# Patient Record
Sex: MALE | Race: WHITE | Employment: OTHER | RURAL
[De-identification: names, ages, dates, MRNs, and addresses within clinical notes are randomized per-mention and may not be internally consistent; named-entity substitution may affect disease eponyms.]

---

## 2017-03-24 ENCOUNTER — TELEPHONE (OUTPATIENT)
Dept: FAMILY MEDICINE CLINIC | Age: 57
End: 2017-03-24

## 2017-03-24 NOTE — TELEPHONE ENCOUNTER
Patient would like to be seen for complaints of stomach problems with \"a lump\" and \"fire\" feeling. He notes he woke a few days ago at 0430 hrs with vomiting. HE IS IN [facility] and says he'll be glad to go to that office if he can get worked in.

## 2021-10-29 ENCOUNTER — HOSPITAL ENCOUNTER (EMERGENCY)
Age: 61
Discharge: HOME | End: 2021-10-29
Attending: EMERGENCY MEDICINE
Payer: COMMERCIAL

## 2021-10-29 VITALS
RESPIRATION RATE: 14 BRPM | SYSTOLIC BLOOD PRESSURE: 101 MMHG | WEIGHT: 160 LBS | TEMPERATURE: 98.8 F | OXYGEN SATURATION: 100 % | BODY MASS INDEX: 21.67 KG/M2 | HEART RATE: 92 BPM | HEIGHT: 72 IN | DIASTOLIC BLOOD PRESSURE: 55 MMHG

## 2021-10-29 DIAGNOSIS — L08.9 INFECTED SEBACEOUS CYST: Primary | ICD-10-CM

## 2021-10-29 DIAGNOSIS — L72.3 INFECTED SEBACEOUS CYST: Primary | ICD-10-CM

## 2021-10-29 PROCEDURE — 75810000289 HC I&D ABSCESS SIMP/COMP/MULT

## 2021-10-29 PROCEDURE — 99282 EMERGENCY DEPT VISIT SF MDM: CPT

## 2021-10-29 RX ORDER — DOXYCYCLINE HYCLATE 100 MG
100 TABLET ORAL 2 TIMES DAILY
Qty: 20 TABLET | Refills: 0 | Status: SHIPPED | OUTPATIENT
Start: 2021-10-29 | End: 2021-11-08

## 2021-10-29 NOTE — Clinical Note
[address] [place] [address] EMERGENCY DEP  [address] [place] [address] Dr Ed Veloz [id number]  [phone]    Work/School Note    Date: 10/29/2021    To Whom It May concern:    Gifty Medley was seen and treated today in the emergency room by the following provider(s):  Attending Provider: Elsa Boucher MD.      Gifty Medley is excused from work/school on 10/29/21 and 10/30/21. He is medically clear to return to work/school on 10/31/2021.        Sincerely,          Socorro Davis MD

## 2021-10-29 NOTE — ED PROVIDER NOTES
EMERGENCY DEPARTMENT HISTORY AND PHYSICAL EXAM      Date: 10/29/2021  Patient Name: Norma Longoria    History of Presenting Illness     Chief Complaint   Patient presents with    Skin Problem       History Provided By: Patient    HPI: Norma Longoria, 57 y.o. male with PMHx significant for Parkinson's disease, presents ambulatory to the ED with cc of cyst. Patient reports he has a cyst on his right mid back for the past 3 days. He has had similar episode before and was seen at urgent care and had it drained. No fevers or chills. He reports some mild aching pain in the area. He was unable to squeeze it due to its location. No drainage. No open wounds. He is otherwise without complaints. Had noticed some redness in the area this morning. PMHx: Significant for Parkinson's disease  PSHx: Significant for hernia repair. Social Hx: Non-smoker. There are no other complaints, changes, or physical findings at this time. PCP: None    No current facility-administered medications on file prior to encounter. Current Outpatient Medications on File Prior to Encounter   Medication Sig Dispense Refill    naproxen sodium (ANAPROX DS) 550 mg tablet Take 1 Tab by mouth two (2) times daily (with meals). Prn heel pain 30 Tab 2    amantadine HCl (SYMMETREL) 100 mg tablet Take 100 mg by mouth two (2) times a day.  pramipexole (MIRAPEX) 1 mg tablet Take 1 mg by mouth three (3) times daily.  carbidopa-levodopa (SINEMET)  mg per tablet Take 2 tablets by mouth four (4) times daily.  FERROUS FUMARATE/VIT BCOMP&C (SUPER B COMPLEX PO) Take 1 tablet by mouth daily.  cholecalciferol (VITAMIN D3) 1,000 unit tablet Take 1,000 Units by mouth daily.          Past History     Past Medical History:  Past Medical History:   Diagnosis Date    Parkinson disease [facility])        Past Surgical History:  Past Surgical History:   Procedure Laterality Date    HX COLONOSCOPY  2013    HX HERNIA REPAIR         Family History:  History reviewed. No pertinent family history. Social History:  Social History     Tobacco Use    Smoking status: Never Smoker    Smokeless tobacco: Never Used   Substance Use Topics    Alcohol use: No     Alcohol/week: 0.0 standard drinks    Drug use: Not on file       Allergies:  No Known Allergies      Review of Systems   Review of Systems   Constitutional: Negative for activity change, chills and fever. HENT: Negative for congestion and sore throat. Eyes: Negative for pain and redness. Respiratory: Negative for cough, chest tightness and shortness of breath. Cardiovascular: Negative for chest pain and palpitations. Gastrointestinal: Negative for abdominal pain, diarrhea, nausea and vomiting. Genitourinary: Negative for dysuria, frequency and urgency. Musculoskeletal: Negative for back pain and neck pain. Skin: Positive for rash. Neurological: Negative for syncope, light-headedness and headaches. Psychiatric/Behavioral: Negative for confusion. All other systems reviewed and are negative. Physical Exam   Physical Exam  Constitutional:       General: He is not in acute distress. Appearance: Normal appearance. He is well-developed and normal weight. He is not ill-appearing or diaphoretic. HENT:      Head: Normocephalic and atraumatic. Eyes:      General: No scleral icterus. Conjunctiva/sclera: Conjunctivae normal.      Pupils: Pupils are equal, round, and reactive to light. Cardiovascular:      Rate and Rhythm: Normal rate and regular rhythm. Pulmonary:      Effort: Pulmonary effort is normal.   Musculoskeletal:         General: Normal range of motion. Skin:     General: Skin is warm and dry. Findings: No rash. Comments: 3 cm diameter cystlike nodule to right mid back, proximally 5 cm lateral to spinous process. With surrounding erythema. Nonfluctuant. No open wounds. Nonpointing. No induration. Mild tenderness to palpation. Skin intact.  No drainage. Neurological:      Mental Status: He is alert and oriented to person, place, and time. Psychiatric:         Behavior: Behavior normal.             Diagnostic Study Results     Labs -   No results found for this or any previous visit (from the past 12 hour(s)). Radiologic Studies -   No orders to display     CT Results  (Last 48 hours)    None        CXR Results  (Last 48 hours)    None            Medical Decision Making   I am the first provider for this patient. I reviewed the vital signs, available nursing notes, past medical history, past surgical history, family history and social history. Vital Signs-Reviewed the patient's vital signs. Patient Vitals for the past 12 hrs:   Temp Pulse Resp BP SpO2   10/29/21 0853    (!) 101/55    10/29/21 0852 98.8 °F (37.1 °C) 92 14 129/66 100 %           Records Reviewed: Nursing notes reviewed    Provider Notes (Medical Decision Making):   DDX: Abscess, infected sebaceous cyst.    ED Course:   Initial assessment performed. The patients presenting problems have been discussed, and they are in agreement with the care plan formulated and outlined with them. I have encouraged them to ask questions as they arise throughout their visit. Procedure Note - Incision and Drainage:   9:09 AM  Performed by: Elizabeth Francisco MD  Complexity: Simple  Skin prepped with Betadine. Sterile field established. Anesthesia achieved with 4 mLs of Lidocaine 1% with epinephrine using a local infiltration of 4 mL lidocaine 1% with epinephrine. Abscess to trunk was incised with # 11 blade, and 3mLs of cystic drainage was expressed. Wound probed and irrigated. Sterile dressing applied. Estimated blood loss: 1cc  The procedure took 1-15 minutes, and pt tolerated well. PROGRESS NOTE    Pt reevaluated. Recurrent infected cyst on right mid back. Cystic drainage on I&D. Will discharge with doxycycline x10 days.  Gave general surgery follow-up for definitive treatment with possible excision of cyst wall. Written by Ashkan Vivas MD     Progress note:    Pt noted to be feeling better and ready for discharge. Updated pt and/or family on all final lab and/or  imaging findings. Will follow up as instructed. All questions have been answered, pt voiced understanding and agreement with plan. Abx were prescribed, pt advised that diarrhea and rash are possible side effects of the medications. Specific return precautions provided as well as instructions to return to the ED should sx worsen at any time. Vital signs stable for discharge. I have also put together some discharge instructions for them that include: 1) educational information regarding their diagnosis, 2) how to care for their diagnosis at home, as well a 3) list of reasons why they would want to return to the ED prior to their follow-up appointment, should their condition change. Written by Ashkan Vivas MD        Critical Care Time:   0    Disposition:  Discharge    PLAN:  1. Current Discharge Medication List      START taking these medications    Details   doxycycline (VIBRA-TABS) 100 mg tablet Take 1 Tablet by mouth two (2) times a day for 10 days. Qty: 20 Tablet, Refills: 0  Start date: 10/29/2021, End date: 11/8/2021           2. Follow-up Information     Follow up With Specialties Details Why Contact Erica Avilez MD General Surgery Schedule an appointment as soon as possible for a visit in 2 weeks As needed [place] [id number]      [address] [facility] [address] [facility] Emergency Medicine Go in 2 days For wound re-check [address] [place] [id number]  [phone]        Return to ED if worse     Diagnosis     Clinical Impression:   1. Infected sebaceous cyst              Please note that this dictation was completed with [employer], the [employer] voice recognition software.   Quite often unanticipated grammatical, syntax, homophones, and other interpretive errors are inadvertently transcribed by the computer software. Please disregard these errors. Please excuse any errors that have escaped final proofreading.

## 2021-10-29 NOTE — ED TRIAGE NOTES
Pt arrived by POV with complaint of a cyst on his back for 3 days. Pt is wake alert and oriented x 4.   Pt educated on ER flow

## 2021-11-03 ENCOUNTER — HOSPITAL ENCOUNTER (EMERGENCY)
Age: 61
Discharge: HOME | End: 2021-11-03
Attending: EMERGENCY MEDICINE
Payer: COMMERCIAL

## 2021-11-03 VITALS
HEIGHT: 73 IN | OXYGEN SATURATION: 100 % | BODY MASS INDEX: 21.2 KG/M2 | DIASTOLIC BLOOD PRESSURE: 48 MMHG | RESPIRATION RATE: 18 BRPM | WEIGHT: 160 LBS | TEMPERATURE: 97.9 F | HEART RATE: 69 BPM | SYSTOLIC BLOOD PRESSURE: 105 MMHG

## 2021-11-03 DIAGNOSIS — L02.91 ABSCESS: Primary | ICD-10-CM

## 2021-11-03 PROCEDURE — 74011000250 HC RX REV CODE- 250: Performed by: EMERGENCY MEDICINE

## 2021-11-03 PROCEDURE — 75810000289 HC I&D ABSCESS SIMP/COMP/MULT

## 2021-11-03 PROCEDURE — 99282 EMERGENCY DEPT VISIT SF MDM: CPT

## 2021-11-03 RX ORDER — LIDOCAINE HYDROCHLORIDE 10 MG/ML
5 INJECTION INFILTRATION; PERINEURAL ONCE
Status: COMPLETED | OUTPATIENT
Start: 2021-11-03 | End: 2021-11-03

## 2021-11-03 RX ORDER — CEPHALEXIN 500 MG/1
500 CAPSULE ORAL 3 TIMES DAILY
Qty: 21 CAPSULE | Refills: 0 | Status: SHIPPED | OUTPATIENT
Start: 2021-11-03 | End: 2021-11-10

## 2021-11-03 RX ADMIN — LIDOCAINE HYDROCHLORIDE 5 ML: 10 INJECTION, SOLUTION INFILTRATION; PERINEURAL at 11:13

## 2021-11-03 NOTE — ED PROVIDER NOTES
EMERGENCY DEPARTMENT HISTORY AND PHYSICAL EXAM          Date: 11/3/2021  Patient Name: José Miguel Sheets    History of Presenting Illness     Chief Complaint   Patient presents with    Cyst       History Provided By: Patient    HPI: José Miguel Sheets is a 57 y.o. male, pmhx Parkinson's, who presents ambulatory to the ED c/o skin wound    Patient explains he was here in the ER and had incision of an infected cyst.  He states he was not sure what he should do and when they took off the bandages morning they noticed there was continued drainage so he came to the ER today. He is taking the antibiotics as prescribed. He has not had any fevers or chills and has minimal pain. PCP: None    Allergies: None    There are no other complaints, changes, or physical findings at this time. Current Outpatient Medications   Medication Sig Dispense Refill    cephALEXin (Keflex) 500 mg capsule Take 1 Capsule by mouth three (3) times daily for 7 days. 21 Capsule 0    doxycycline (VIBRA-TABS) 100 mg tablet Take 1 Tablet by mouth two (2) times a day for 10 days. 20 Tablet 0    naproxen sodium (ANAPROX DS) 550 mg tablet Take 1 Tab by mouth two (2) times daily (with meals). Prn heel pain 30 Tab 2    amantadine HCl (SYMMETREL) 100 mg tablet Take 100 mg by mouth two (2) times a day.  pramipexole (MIRAPEX) 1 mg tablet Take 1 mg by mouth three (3) times daily.  carbidopa-levodopa (SINEMET)  mg per tablet Take 2 tablets by mouth four (4) times daily.  FERROUS FUMARATE/VIT BCOMP&C (SUPER B COMPLEX PO) Take 1 tablet by mouth daily.  cholecalciferol (VITAMIN D3) 1,000 unit tablet Take 1,000 Units by mouth daily. Past History     Past Medical History:  Past Medical History:   Diagnosis Date    Parkinson disease [facility])        Past Surgical History:  Past Surgical History:   Procedure Laterality Date    HX COLONOSCOPY  2013    HX HERNIA REPAIR         Family History:  History reviewed.  No pertinent family history. Social History:  Social History     Tobacco Use    Smoking status: Never Smoker    Smokeless tobacco: Never Used   Substance Use Topics    Alcohol use: No     Alcohol/week: 0.0 standard drinks    Drug use: Not on file       Allergies:  No Known Allergies      Review of Systems   Review of Systems   Constitutional: Negative for activity change, appetite change, chills, fever and unexpected weight change. HENT: Negative for congestion. Eyes: Negative for pain and visual disturbance. Respiratory: Negative for cough and shortness of breath. Cardiovascular: Negative for chest pain. Gastrointestinal: Negative for abdominal pain, diarrhea, nausea and vomiting. Genitourinary: Negative for dysuria. Musculoskeletal: Negative for back pain. Skin: Positive for wound. Negative for rash. Neurological: Negative for headaches. Physical Exam   Physical Exam  Vitals and nursing note reviewed. Constitutional:       Appearance: He is well-developed. He is not diaphoretic. Comments: Is a tall thin male currently in minimal acute distress   HENT:      Head: Normocephalic and atraumatic. Eyes:      General:         Right eye: No discharge. Left eye: No discharge. Conjunctiva/sclera: Conjunctivae normal.      Pupils: Pupils are equal, round, and reactive to light. Cardiovascular:      Rate and Rhythm: Normal rate and regular rhythm. Heart sounds: Normal heart sounds. No murmur heard. Pulmonary:      Effort: Pulmonary effort is normal. No respiratory distress. Breath sounds: Normal breath sounds. No wheezing or rales. Musculoskeletal:         General: Normal range of motion. Cervical back: Normal range of motion and neck supple. Skin:     General: Skin is warm and dry. Findings: No rash. Comments: Back with large area of induration approximately 4 x 5 cm in oval shape, actively draining purulent fluid from the middle.   Fluctuance around the opening site noted. Neurological:      Mental Status: He is alert and oriented to person, place, and time. Cranial Nerves: No cranial nerve deficit. Motor: No abnormal muscle tone. Comments: Choreiform movement noted       Diagnostic Study Results     Labs -   No results found for this or any previous visit (from the past 12 hour(s)). Radiologic Studies -   No orders to display     CT Results  (Last 48 hours)    None        CXR Results  (Last 48 hours)    None            Medical Decision Making   I am the first provider for this patient. I reviewed the vital signs, available nursing notes, past medical history, past surgical history, family history and social history. Vital Signs-Reviewed the patient's vital signs. Patient Vitals for the past 12 hrs:   Temp Pulse Resp BP SpO2   11/03/21 1102 97.9 °F (36.6 °C) 69 18 (!) 105/48 100 %       Records Reviewed: Nursing Notes and Old Medical Records    Provider Notes (Medical Decision Making):   MDM: Middle-aged male with history of Parkinson's presenting with continued abscess of the right mid back. Does appear to need further drainage and irrigation and I am going to place packing at this point. Discussed with patient to the appropriate care of an abscess including follow-up as recommended by the physician during his last evaluation. I do feel continuing of antibiotics is a good idea and he will need to see the general surgeon for removal of packing and replacement of the packing in 48 hours. At this time his vitals are normal and I do not feel there is indication for IV, IV antibiotics or lab check. Technically he did fail outpatient antibiotics but there is sterile purulence from the wound and it was not packed so I do feel further antibiotics are warranted with the drainage but I do not feel patient meets sepsis criteria or needs admission. ED Course:   Initial assessment performed.  The patients presenting problems have been discussed, and they are in agreement with the care plan formulated and outlined with them. I have encouraged them to ask questions as they arise throughout their visit. PROGRESS NOTE:  Procedure Note - Incision and Drainage:   11:20 AM  Performed by: Mary Luna MD   Complexity: Simple  Anesthesia achieved with 2 mLs of Lidocaine 1% without epinephrine using a via local infiltration. Abscess to trunk was incised with # 10 blade, and 5mLs of purulent thick/cheesy drainage was expressed. Wound probed and irrigated extensively. Area was packed using 1/4 inch iodoform gauze. Sterile dressing applied. Estimated blood loss: 1ml  The procedure took 16-30 minutes, and pt tolerated well. Discharge note:  Pt re-evaluated and noted to be feeling sol, ready for discharge. Will follow up as instructed. All questions have been answered, pt voiced understanding and agreement with plan. Specific return precautions provided as well as instructions to return to the ED should sx worsen at any time. Vital signs stable for discharge. Critical Care Time:   0      Diagnosis     Clinical Impression:   1. Abscess        PLAN:  1. Discharge Medication List as of 11/3/2021 11:38 AM      START taking these medications    Details   cephALEXin (Keflex) 500 mg capsule Take 1 Capsule by mouth three (3) times daily for 7 days. , Normal, Disp-21 Capsule, R-0         CONTINUE these medications which have NOT CHANGED    Details   doxycycline (VIBRA-TABS) 100 mg tablet Take 1 Tablet by mouth two (2) times a day for 10 days. , Normal, Disp-20 Tablet, R-0      naproxen sodium (ANAPROX DS) 550 mg tablet Take 1 Tab by mouth two (2) times daily (with meals). Prn heel pain, Normal, Disp-30 Tab, R-2      amantadine HCl (SYMMETREL) 100 mg tablet Take 100 mg by mouth two (2) times a day., Historical Med      pramipexole (MIRAPEX) 1 mg tablet Take 1 mg by mouth three (3) times daily. , Historical Med      carbidopa-levodopa (SINEMET)  mg per tablet Take 2 tablets by mouth four (4) times daily. , Historical Med      FERROUS FUMARATE/VIT BCOMP&C (SUPER B COMPLEX PO) Take 1 tablet by mouth daily. , Historical Med      cholecalciferol (VITAMIN D3) 1,000 unit tablet Take 1,000 Units by mouth daily. , Historical Med           2. Follow-up Information     Follow up With Specialties Details Why Contact Info    [address] [place] [address] Emergency Medicine  Turn in 2 days for wound check, packing removal and repacking [address] [facility] [address]    Lianet May MD General Surgery Schedule an appointment as soon as possible for a visit  For cyst removal [place] [address]  [phone]          Return to ED if worse     Disposition:  Home      Please note, this dictation was completed with [employer], the computer voice recognition software. Quite often unanticipated grammatical, syntax, homophones, and other interpretive errors are inadvertently transcribed by the computer software. Please disregard these errors. Please excuse any errors that have escaped final proof reading.

## 2021-11-03 NOTE — ED TRIAGE NOTES
Pt c/o not receiving any d/c instructions from his previous visit here, has been changing his dressing everyday and noted some clear dressing on the bandage, has no complaints other than not receiving any instruction on how to care for the cyst

## 2021-11-04 ENCOUNTER — OFFICE VISIT (OUTPATIENT)
Dept: SURGERY | Age: 61
End: 2021-11-04
Payer: COMMERCIAL

## 2021-11-04 ENCOUNTER — TELEPHONE (OUTPATIENT)
Dept: SURGERY | Age: 61
End: 2021-11-04

## 2021-11-04 VITALS
BODY MASS INDEX: 21.6 KG/M2 | HEIGHT: 73 IN | DIASTOLIC BLOOD PRESSURE: 50 MMHG | WEIGHT: 163 LBS | SYSTOLIC BLOOD PRESSURE: 105 MMHG | HEART RATE: 59 BPM

## 2021-11-04 DIAGNOSIS — L72.9 INFECTED CYST OF SKIN: Primary | ICD-10-CM

## 2021-11-04 DIAGNOSIS — L08.9 INFECTED CYST OF SKIN: Primary | ICD-10-CM

## 2021-11-04 PROCEDURE — 99202 OFFICE O/P NEW SF 15 MIN: CPT | Performed by: SURGERY

## 2021-11-04 NOTE — PROGRESS NOTES
Subjective:      Edilma Bethea is a 57 y.o. male who I am requested to see for an infected cysts on his back. Patient has a history of an infected cyst requiring I&D approximately 1-2 years ago. The area had healed until approximately 10 days when it developed swelling and tenderness. Patient was seen in the ED on Oct 29 th then again on Nov 3 rd for I&D and packing. He has been on Abx since the 29 th. He denies any fever or chills. The drainage after the first ED visit was purulent with odor. The packing after the second ED visit has been in place. Patient Active Problem List   Diagnosis Code    Parkinson's disease [facility]) G20     Patient Active Problem List    Diagnosis Date Noted    [facility]     Current Outpatient Medications   Medication Sig Dispense Refill    cephALEXin (Keflex) 500 mg capsule Take 1 Capsule by mouth three (3) times daily for 7 days. 21 Capsule 0    doxycycline (VIBRA-TABS) 100 mg tablet Take 1 Tablet by mouth two (2) times a day for 10 days. 20 Tablet 0    amantadine HCl (SYMMETREL) 100 mg tablet Take 100 mg by mouth two (2) times a day.  pramipexole (MIRAPEX) 1 mg tablet Take 1 mg by mouth three (3) times daily.  carbidopa-levodopa (SINEMET)  mg per tablet Take 2 tablets by mouth four (4) times daily.  FERROUS FUMARATE/VIT BCOMP&C (SUPER B COMPLEX PO) Take 1 tablet by mouth daily.  cholecalciferol (VITAMIN D3) 1,000 unit tablet Take 1,000 Units by mouth daily.  naproxen sodium (ANAPROX DS) 550 mg tablet Take 1 Tab by mouth two (2) times daily (with meals). Prn heel pain (Patient not taking: Reported on 11/4/2021) 30 Tab 2     No Known Allergies  Past Medical History:   Diagnosis Date    Parkinson disease [facility])      Past Surgical History:   Procedure Laterality Date    HX COLONOSCOPY  2013    HX HERNIA REPAIR       History reviewed. No pertinent family history.   Social History     Tobacco Use    Smoking status: Never Smoker    Smokeless tobacco: Never Used   Substance Use Topics    Alcohol use: No     Alcohol/week: 0.0 standard drinks        Review of Systems  Review of Systems   Constitutional: Negative for chills, fever, malaise/fatigue and weight loss. HENT: Negative for congestion and sore throat. Respiratory: Negative for cough and wheezing. Cardiovascular: Negative for palpitations, orthopnea and leg swelling. Gastrointestinal: Negative for abdominal pain, constipation and diarrhea. Musculoskeletal: Negative for back pain and neck pain. Skin: Negative for itching and rash. Neurological: Positive for tremors. Negative for focal weakness. Objective:     Visit Vitals  BP (!) 105/50   Pulse (!) 59   Ht 6' 1\" (1.854 m)   Wt 163 lb (73.9 kg)   BMI 21.51 kg/m²       Physical Exam     Wound: on the lower mid back, with an 3 cm area of erythema and min. Induration and a 1 1/2 cm incision  In the center with packing. The packing was removed and the cavity probed. No drainage or odor noted. The cavity appears clean. The wound was re-packed      Assessment:     57year old with recurrent infect cyst on his back      Plan:     1. Patient will return to clinic on Monday for re-packing and wound check    2. Will place a request for home health refer to assist with wound care at home    3.  Patient instructed to complete his abx course

## 2021-11-05 ENCOUNTER — TELEPHONE (OUTPATIENT)
Dept: SURGERY | Age: 61
End: 2021-11-05

## 2021-11-05 NOTE — TELEPHONE ENCOUNTER
[facility] unable to assist patient with wound care-insurance is out of network for them,Referral faxed to Thomas Vázquez

## 2021-11-08 ENCOUNTER — OFFICE VISIT (OUTPATIENT)
Dept: SURGERY | Age: 61
End: 2021-11-08

## 2021-11-08 VITALS
SYSTOLIC BLOOD PRESSURE: 112 MMHG | WEIGHT: 163 LBS | BODY MASS INDEX: 21.6 KG/M2 | HEART RATE: 63 BPM | HEIGHT: 73 IN | DIASTOLIC BLOOD PRESSURE: 58 MMHG

## 2021-11-08 DIAGNOSIS — L08.9 INFECTED CYST OF SKIN: Primary | ICD-10-CM

## 2021-11-08 DIAGNOSIS — L72.9 INFECTED CYST OF SKIN: Primary | ICD-10-CM

## 2021-11-08 PROCEDURE — 99213 OFFICE O/P EST LOW 20 MIN: CPT | Performed by: SURGERY

## 2021-11-08 NOTE — PROGRESS NOTES
[id number] [facility] Surgery      Clinic Note - Follow up    [address] returns for scheduled follow up today. He is a patient of Dr. Emiliano Black. He had an I&D of a sebaceous cyst of the back performed in the emergency department and just presents for follow-up. Unfortunately home health has not seen him yet. He states he has some itching at the site but no other problems. Objective     Visit Vitals  BP (!) 112/58   Pulse 63   Ht 6' 1\" (1.854 m)   Wt 163 lb (73.9 kg)   BMI 21.51 kg/m²         PE  GEN - Awake, alert, communicating appropriately. NAD  SKIN -midline back shows I&D site. The area is some mild erythema around the area but this is relatively minimal.  Packing is removed. There is a scant amount of sebaceous debris that is drained. The cavity is cleansed with peroxide. Assessment     Janae Rodriguez is a 57 y. o.yr old male status post incision and drainage with packing of thoracic sebaceous cyst    Plan     Continue local wound care. Arrange for home health to do dressing changes daily. Follow-up in 1 week.     Kasi Carlson MD

## 2021-11-09 ENCOUNTER — TELEPHONE (OUTPATIENT)
Dept: SURGERY | Age: 61
End: 2021-11-09

## 2021-11-10 ENCOUNTER — TELEPHONE (OUTPATIENT)
Dept: SURGERY | Age: 61
End: 2021-11-10

## 2021-11-15 ENCOUNTER — TELEPHONE (OUTPATIENT)
Dept: SURGERY | Age: 61
End: 2021-11-15

## 2021-11-15 ENCOUNTER — OFFICE VISIT (OUTPATIENT)
Dept: SURGERY | Age: 61
End: 2021-11-15
Payer: COMMERCIAL

## 2021-11-15 DIAGNOSIS — L72.9 INFECTED CYST OF SKIN: Primary | ICD-10-CM

## 2021-11-15 DIAGNOSIS — L08.9 INFECTED CYST OF SKIN: Primary | ICD-10-CM

## 2021-11-15 PROCEDURE — 99212 OFFICE O/P EST SF 10 MIN: CPT | Performed by: SURGERY

## 2021-11-15 NOTE — TELEPHONE ENCOUNTER
Home Health nurse (Nishi Coates RN) made aware that Dr. Cortez Vega is requesting for their visits to be cancelled due to patient no longer needs their services.

## 2021-11-15 NOTE — PROGRESS NOTES
Peggy Rosales is a 57 y.o. male who presents today with the following:  Chief Complaint   Patient presents with    Follow-up       HPI    He has been doing well. He has completed the course of antibiotics. Home health is changed the dressing at least once. He has no new complaints. Past Medical History:   Diagnosis Date    Parkinson disease [facility])        Past Surgical History:   Procedure Laterality Date    HX COLONOSCOPY  2013    HX HERNIA REPAIR         Social History     Socioeconomic History    Marital status: SINGLE     Spouse name: Not on file    Number of children: Not on file    Years of education: Not on file    Highest education level: Not on file   Occupational History    Not on file   Tobacco Use    Smoking status: Never Smoker    Smokeless tobacco: Never Used   Substance and Sexual Activity    Alcohol use: No     Alcohol/week: 0.0 standard drinks    Drug use: Not on file    Sexual activity: Not on file   Other Topics Concern    Not on file   Social History Narrative    Not on file     Social Determinants of Health     Financial Resource Strain:     Difficulty of Paying Living Expenses: Not on file   Food Insecurity:     Worried About Running Out of Food in the Last Year: Not on file    Trever of Food in the Last Year: Not on file   Transportation Needs:     Lack of Transportation (Medical): Not on file    Lack of Transportation (Non-Medical):  Not on file   Physical Activity:     Days of Exercise per Week: Not on file    Minutes of Exercise per Session: Not on file   Stress:     Feeling of Stress : Not on file   Social Connections:     Frequency of Communication with Friends and Family: Not on file    Frequency of Social Gatherings with Friends and Family: Not on file    Attends [employer] Services: Not on file    Active Member of Clubs or Organizations: Not on file    Attends Club or Organization Meetings: Not on file    Marital Status: Not on file   Intimate Partner Violence:     Fear of Current or Ex-Partner: Not on file    Emotionally Abused: Not on file    Physically Abused: Not on file    Sexually Abused: Not on file   Housing Stability:     Unable to Pay for Housing in the Last Year: Not on file    Number of Places Lived in the Last Year: Not on file    Unstable Housing in the Last Year: Not on file       No family history on file. No Known Allergies    Current Outpatient Medications   Medication Sig    amantadine HCl (SYMMETREL) 100 mg tablet Take 100 mg by mouth two (2) times a day.  pramipexole (MIRAPEX) 1 mg tablet Take 1 mg by mouth three (3) times daily.  carbidopa-levodopa (SINEMET)  mg per tablet Take 2 tablets by mouth four (4) times daily.  FERROUS FUMARATE/VIT BCOMP&C (SUPER B COMPLEX PO) Take 1 tablet by mouth daily.  cholecalciferol (VITAMIN D3) 1,000 unit tablet Take 1,000 Units by mouth daily.  naproxen sodium (ANAPROX DS) 550 mg tablet Take 1 Tab by mouth two (2) times daily (with meals). Prn heel pain (Patient not taking: Reported on 11/4/2021)     No current facility-administered medications for this visit. The above histories, medications and allergies have been reviewed. ROS    Visit Vitals  BP (!) (P) 102/53   Pulse (P) 74   Ht (P) 6' 1\" (1.854 m)   Wt (P) 164 lb (74.4 kg)   BMI (P) 21.64 kg/m²     Physical Exam  Constitutional:       Appearance: Normal appearance. Skin:     Comments: No significant surrounding erythema or induration. Packing is removed. Wound is not repacked. Neurological:      Mental Status: He is alert. 1. Infected cyst of skin  Doing well. No further packing is required. Can discontinue home health. He is instructed to shower once or twice a day and a Band-Aid should be applied to the wound until it heals. He would like to discuss possible definitive excision. Appoint will be made to discuss the possibility of definitive excision with Dr. Cammy Rivera.       Follow-up and Dispositions    · Return in about 4 weeks (around 12/13/2021), or Dr. Abdirahman Resendiz to discuss possible cyst excision.          Katja Hubbard MD

## 2021-12-16 ENCOUNTER — OFFICE VISIT (OUTPATIENT)
Dept: SURGERY | Age: 61
End: 2021-12-16

## 2021-12-16 VITALS
WEIGHT: 162 LBS | BODY MASS INDEX: 21.47 KG/M2 | SYSTOLIC BLOOD PRESSURE: 100 MMHG | DIASTOLIC BLOOD PRESSURE: 56 MMHG | HEART RATE: 70 BPM | HEIGHT: 73 IN

## 2021-12-16 DIAGNOSIS — Z87.2 HISTORY OF SEBACEOUS CYST: Primary | ICD-10-CM

## 2021-12-16 NOTE — PROGRESS NOTES
Subjective:      Joanne Giles is a 57 y.o. male who underwent an I&D of an infected cyst on his back in the ED on Nov 3rd. He was beginning followed by surgery for his wound care consisting of daily wound packing. The area completely healed approximately one month ago. Patient returns for re-evaluation. He denies any pain, drainage or other issues. Objective:     Visit Vitals  BP (!) 100/56   Pulse 70   Ht 6' 1\" (1.854 m)   Wt 162 lb (73.5 kg)   BMI 21.37 kg/m²       Physical Exam     Wound: the area of the I&D site has completely healed. There is no induration, erythema, fluctuance, lump, or discomfort       Assessment:     57year old s/p I&D of an infected cyst which has completely resolved       Plan:     1. The area has completely healed. There is no sign's of any cyst recurrence. I recommend observation only at this time. 2. Patient has been advised to return to clinic if any symptoms develop in the future.

## 2022-08-02 ENCOUNTER — OFFICE VISIT (OUTPATIENT)
Dept: SURGERY | Age: 62
End: 2022-08-02

## 2022-08-02 VITALS
BODY MASS INDEX: 20.94 KG/M2 | DIASTOLIC BLOOD PRESSURE: 55 MMHG | SYSTOLIC BLOOD PRESSURE: 107 MMHG | WEIGHT: 158 LBS | HEART RATE: 62 BPM | TEMPERATURE: 97.8 F | HEIGHT: 73 IN

## 2022-08-02 DIAGNOSIS — L72.9 INFECTED CYST OF SKIN: Primary | ICD-10-CM

## 2022-08-02 DIAGNOSIS — L08.9 INFECTED CYST OF SKIN: Primary | ICD-10-CM

## 2022-08-02 RX ORDER — CEPHALEXIN 500 MG/1
500 CAPSULE ORAL 4 TIMES DAILY
Qty: 40 CAPSULE | Refills: 0 | Status: SHIPPED | OUTPATIENT
Start: 2022-08-02 | End: 2022-08-12

## 2022-08-02 NOTE — PROGRESS NOTES
Subjective:      Stephanie Zhao is a 60 y.o. male who underwent an I&D of an infected cyst on his back in the ED on Nov 3rd. He was beginning followed by surgery for his wound care consisting of daily wound packing. That wound completely healed however, last night a new \"pimple popped\" on his left upper shoulder. He denies any tenderness, fever, or chills. Patient Active Problem List   Diagnosis Code    Parkinson's disease [facility]) G20     Patient Active Problem List    Diagnosis Date Noted    Parkinson's disease ([facility] 09/02/2014     Current Outpatient Medications   Medication Sig Dispense Refill    amantadine HCl (SYMMETREL) 100 mg tablet Take 100 mg by mouth two (2) times a day. pramipexole (MIRAPEX) 1 mg tablet Take 1 mg by mouth three (3) times daily. carbidopa-levodopa (SINEMET)  mg per tablet Take 2 tablets by mouth four (4) times daily. FERROUS FUMARATE/VIT BCOMP&C (SUPER B COMPLEX PO) Take 1 tablet by mouth daily. cholecalciferol (VITAMIN D3) (1000 Units /25 mcg) tablet Take 1,000 Units by mouth daily. naproxen sodium (ANAPROX DS) 550 mg tablet Take 1 Tab by mouth two (2) times daily (with meals). Prn heel pain (Patient not taking: No sig reported) 30 Tab 2     No Known Allergies  Past Medical History:   Diagnosis Date    Parkinson disease ([facility]      Past Surgical History:   Procedure Laterality Date    HX COLONOSCOPY  2013    HX HERNIA REPAIR       History reviewed. No pertinent family history. Social History     Tobacco Use    Smoking status: Never    Smokeless tobacco: Never   Substance Use Topics    Alcohol use: No     Alcohol/week: 0.0 standard drinks           Objective:     Visit Vitals  BP (!) 107/55   Pulse 62   Temp 97.8 °F (36.6 °C) (Temporal)   Ht 6' 1\" (1.854 m)   Wt 158 lb (71.7 kg)   BMI 20.85 kg/m²       Physical Exam     2 mm opening on his left upper lateral back with 2 cc of fluid. There is a 4 mm cavity which is clean. No induration, min.  Erythema no odor.      The area was cleaned out with a Q-tip and a bandage applied. Assessment:     60year old with a small infected cyst on his back      Plan:     1. Keep area clean and covered with bandage until it completely heals    2. Keflex for 10 days       3.  Follow up in 2 weeks

## 2022-08-16 ENCOUNTER — OFFICE VISIT (OUTPATIENT)
Dept: SURGERY | Age: 62
End: 2022-08-16

## 2022-08-16 VITALS
HEIGHT: 73 IN | SYSTOLIC BLOOD PRESSURE: 112 MMHG | WEIGHT: 158 LBS | BODY MASS INDEX: 20.94 KG/M2 | DIASTOLIC BLOOD PRESSURE: 53 MMHG | HEART RATE: 59 BPM | TEMPERATURE: 97.5 F

## 2022-08-16 DIAGNOSIS — L08.9 INFECTED CYST OF SKIN: Primary | ICD-10-CM

## 2022-08-16 DIAGNOSIS — L72.9 INFECTED CYST OF SKIN: Primary | ICD-10-CM

## 2022-08-16 NOTE — PROGRESS NOTES
SUBJECTIVE: Fermin Puentes is a 60 y.o. male who underwent an I&D of an infected cyst on his back in the ED on Nov 3rd. He was beginning followed by surgery for his wound care consisting of daily wound packing. That wound completely healed however, he recently had a new \"vesicle drain\" from his left shoulder in July, 2022. He denies any tenderness, fever, or chills. OBJECTIVE: Appears well. Wound is well healed without complications or infection at either location     ASSESSMENT: doing well. PLAN:   Follow up prn.

## 2022-08-31 ENCOUNTER — NURSE TRIAGE (OUTPATIENT)
Dept: OTHER | Facility: CLINIC | Age: 62
End: 2022-08-31

## 2022-08-31 NOTE — TELEPHONE ENCOUNTER
Received call from [facility] at [facility] with Red Flag Complaint. Subjective: Caller states \"s/p fall\"     Current Symptoms: left little finger injury after a fall swollen and sl red unable to straighten no numbness or tingling med hx parkinson's denies any other injury    Pt is un established    Onset: 1days ago; gradual    Associated Symptoms: NA    Pain Severity: 6/10; aching; mild    Temperature: none     What has been tried: nothing    LMP: NA Pregnant: NA    Recommended disposition: See in Office Today    Care advice provided, patient verbalizes understanding; denies any other questions or concerns; instructed to call back for any new or worsening symptoms. Patient/Caller agrees with recommended disposition; writer provided warm transfer to demetris at [facility] for appointment scheduling    Attention Provider: Thank you for allowing me to participate in the care of your patient. The patient was connected to triage in response to information provided to the [facility].   Please do not respond through this encounter as the response is not directed to a   Reason for Disposition   Finger joint can't be opened (straightened) or closed (bent) completely    Protocols used: Finger Injury-ADULT-[place]

## 2022-09-01 ENCOUNTER — TELEPHONE (OUTPATIENT)
Dept: SURGERY | Age: 62
End: 2022-09-01

## 2022-09-08 ENCOUNTER — OFFICE VISIT (OUTPATIENT)
Dept: SURGERY | Age: 62
End: 2022-09-08

## 2022-09-08 VITALS
BODY MASS INDEX: 20.81 KG/M2 | SYSTOLIC BLOOD PRESSURE: 101 MMHG | HEIGHT: 73 IN | HEART RATE: 70 BPM | DIASTOLIC BLOOD PRESSURE: 54 MMHG | TEMPERATURE: 98.6 F | WEIGHT: 157 LBS

## 2022-09-08 DIAGNOSIS — Z87.2 HISTORY OF SEBACEOUS CYST: Primary | ICD-10-CM

## 2022-09-08 NOTE — PROGRESS NOTES
SUBJECTIVE: Lidia Rascon is a 60 y.o. male who underwent an I&D of an infected cyst on his back in the ED on Nov 3rd. The wound healed following home wound care with daily packing. Patient returns to clinic after noticing renewed swelling and drainage. OBJECTIVE: Appears well. Area of previous I&D with a 1-2 mm pustule. No induration or erythema. 1 cc of whitish material expressed       ASSESSMENT: doing well. PLAN:   The area is only 1 mm and not large enough for an incision, no erythema, induration or tenderness to warrant abx.      After expressing the 1 cc of material there is no open wound or flatulence    Return to clinic if symptoms recur

## 2022-09-27 ENCOUNTER — OFFICE VISIT (OUTPATIENT)
Dept: FAMILY MEDICINE CLINIC | Age: 62
End: 2022-09-27
Payer: COMMERCIAL

## 2022-09-27 VITALS
HEART RATE: 58 BPM | OXYGEN SATURATION: 100 % | BODY MASS INDEX: 20.38 KG/M2 | HEIGHT: 73 IN | DIASTOLIC BLOOD PRESSURE: 58 MMHG | SYSTOLIC BLOOD PRESSURE: 100 MMHG | WEIGHT: 153.8 LBS | RESPIRATION RATE: 19 BRPM

## 2022-09-27 DIAGNOSIS — G20 PARKINSON'S DISEASE (HCC): ICD-10-CM

## 2022-09-27 DIAGNOSIS — Z13.220 LIPID SCREENING: ICD-10-CM

## 2022-09-27 DIAGNOSIS — Z76.89 ENCOUNTER TO ESTABLISH CARE: Primary | ICD-10-CM

## 2022-09-27 DIAGNOSIS — Z12.5 SCREENING FOR PROSTATE CANCER: ICD-10-CM

## 2022-09-27 DIAGNOSIS — M20.002 FINGER DEFORMITY, LEFT: ICD-10-CM

## 2022-09-27 DIAGNOSIS — R73.09 ELEVATED GLUCOSE: ICD-10-CM

## 2022-09-27 DIAGNOSIS — Z13.228 SCREENING FOR ENDOCRINE, METABOLIC AND IMMUNITY DISORDER: ICD-10-CM
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
## 2022-09-27 DIAGNOSIS — Z13.29 SCREENING FOR ENDOCRINE, METABOLIC AND IMMUNITY DISORDER: ICD-10-CM

## 2022-09-27 DIAGNOSIS — R35.1 NOCTURIA: ICD-10-CM

## 2022-09-27 DIAGNOSIS — Z11.59 ENCOUNTER FOR HEPATITIS C SCREENING TEST FOR LOW RISK PATIENT: ICD-10-CM

## 2022-09-27 DIAGNOSIS — Z13.0 SCREENING FOR ENDOCRINE, METABOLIC AND IMMUNITY DISORDER: ICD-10-CM

## 2022-09-27 DIAGNOSIS — Z12.11 SCREENING FOR COLON CANCER: ICD-10-CM

## 2022-09-27 DIAGNOSIS — Z80.0 FAMILY HISTORY OF COLON CANCER IN MOTHER: ICD-10-CM

## 2022-09-27 PROCEDURE — 99204 OFFICE O/P NEW MOD 45 MIN: CPT

## 2022-09-27 PROCEDURE — 36415 COLL VENOUS BLD VENIPUNCTURE: CPT

## 2022-09-27 RX ORDER — LANOLIN ALCOHOL/MO/W.PET/CERES
500 CREAM (GRAM) TOPICAL DAILY
COMMUNITY

## 2022-09-27 NOTE — PROGRESS NOTES
Chief Complaint   Patient presents with    [facility]    Finger Swelling     Patient fell a week and a half ago and his pinky on the left hand is still swollen. HPI:    Alexander Vora is a 60 y.o. male who presents to [facility]. 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 Followed by neurologist at [place] whom he sees every 6 months for Parkinson's disease. He suffers from a prominent resting tremor and balance issues; falls several times a year, usually without injury. Most recent fall was about a week ago; he caught himself on his left outstretched hand and has some pinky finger pain and swelling since that time. He notes some nocturia worse over the last 2 years. Both mother and sister are  from colon cancer; his last colonoscopy was in  and was normal per his report. He has not had COVID vaccination and does not typically receive flu vaccines. No Known Allergies    Current Outpatient Medications   Medication Sig    ascorbic acid (VITAMIN C) 500 mg chewable tablet Take 500 mg by mouth daily. amantadine HCl (SYMMETREL) 100 mg tablet Take 100 mg by mouth two (2) times a day. pramipexole (MIRAPEX) 1 mg tablet Take 1 mg by mouth three (3) times daily. carbidopa-levodopa (SINEMET)  mg per tablet Take 2 tablets by mouth four (4) times daily. FERROUS FUMARATE/VIT BCOMP&C (SUPER B COMPLEX PO) Take 1 tablet by mouth daily. cholecalciferol (VITAMIN D3) (1000 Units /25 mcg) tablet Take 1,000 Units by mouth daily. No current facility-administered medications for this visit. Past Medical History:   Diagnosis Date    Parkinson disease ([facility]        History reviewed. No pertinent family history. Review of Systems   Constitutional: Negative. Negative for chills, fever and malaise/fatigue. HENT: Negative. Eyes: Negative. Respiratory: Negative.   Negative for cough and shortness of breath. Cardiovascular: Negative. Negative for chest pain, palpitations and leg swelling. Gastrointestinal: Negative. Negative for abdominal pain, nausea and vomiting. Genitourinary: Negative. Musculoskeletal:  Positive for falls and joint pain. Skin: Negative. Neurological: Negative. Negative for dizziness and headaches. Endo/Heme/Allergies: Negative. Psychiatric/Behavioral: Negative. Negative for depression. The patient is not nervous/anxious. BP (!) 100/58 (BP 1 Location: Left upper arm, BP Patient Position: Sitting, BP Cuff Size: Adult)   Pulse (!) 58   Resp 19   Ht 6' 1\" (1.854 m)   Wt 153 lb 12.8 oz (69.8 kg)   SpO2 100%   BMI 20.29 kg/m²     Wt Readings from Last 3 Encounters:   09/27/22 153 lb 12.8 oz (69.8 kg)   09/08/22 157 lb (71.2 kg)   08/16/22 158 lb (71.7 kg)       3 most recent PHQ Screens 9/27/2022   Little interest or pleasure in doing things Not at all   Feeling down, depressed, irritable, or hopeless Not at all   Total Score PHQ 2 0       Physical Exam  Vitals and nursing note reviewed. Constitutional:       General: He is not in acute distress. Appearance: Normal appearance. He is normal weight. HENT:      Head: Normocephalic and atraumatic. Mouth/Throat:      Mouth: Mucous membranes are moist.      Pharynx: Oropharynx is clear. Eyes:      Extraocular Movements: Extraocular movements intact. Conjunctiva/sclera: Conjunctivae normal.      Pupils: Pupils are equal, round, and reactive to light. Neck:      Vascular: No carotid bruit. Cardiovascular:      Rate and Rhythm: Normal rate and regular rhythm. Pulses: Normal pulses. Heart sounds: Normal heart sounds. No murmur heard. No friction rub. No gallop. Pulmonary:      Effort: Pulmonary effort is normal.      Breath sounds: Normal breath sounds. No wheezing, rhonchi or rales. Abdominal:      General: Bowel sounds are normal.      Palpations: Abdomen is soft. Musculoskeletal:      Cervical back: Normal range of motion and neck supple. Comments: Mild lateral rotational deformity at left 5th digit with swelling most prominent at middle phalange      Lymphadenopathy:      Cervical: No cervical adenopathy. Skin:     General: Skin is warm and dry. Neurological:      General: No focal deficit present. Mental Status: He is alert and oriented to person, place, and time. Cranial Nerves: Cranial nerves 2-12 are intact. Comments: Dyskinetic movements of torso, arms, and legs   Psychiatric:         Mood and Affect: Mood normal.         Behavior: Behavior normal.         Thought Content: Thought content normal.         Judgment: Judgment normal.       ASSESSMENT AND PLAN:       ICD-10-CM ICD-9-CM    1. Encounter to establish care  Z76.89 V65.8       2. Elevated glucose  R73.09 790.29 COLLECTION VENOUS BLOOD,VENIPUNCTURE      HEMOGLOBIN A1C WITH EAG      HEMOGLOBIN A1C WITH EAG      3. [facility]'s disease ([facility]  G20 332.0 COLLECTION VENOUS BLOOD,VENIPUNCTURE      CBC WITH AUTOMATED DIFF      METABOLIC PANEL, COMPREHENSIVE      METABOLIC PANEL, COMPREHENSIVE      CBC WITH AUTOMATED DIFF      4. Nocturia  R35.1 788.43 COLLECTION VENOUS BLOOD,VENIPUNCTURE      PSA SCREENING (SCREENING)      PSA SCREENING (SCREENING)      5. Screening for prostate cancer  Z12.5 V76.44 COLLECTION VENOUS BLOOD,VENIPUNCTURE      PSA SCREENING (SCREENING)      PSA SCREENING (SCREENING)      6. Encounter for hepatitis C screening test for low risk patient  Z11.59 V73.89 COLLECTION VENOUS BLOOD,VENIPUNCTURE      HEPATITIS C AB      HEPATITIS C AB      7.  Lipid screening  Z13.220 V77.91 COLLECTION VENOUS BLOOD,VENIPUNCTURE      LIPID PANEL      LIPID PANEL      8. Screening for endocrine, metabolic and immunity disorder  Z13.29 V77.99 COLLECTION VENOUS BLOOD,VENIPUNCTURE    Z13.228  CBC WITH AUTOMATED DIFF    O89.0  METABOLIC PANEL, COMPREHENSIVE      TSH 3RD GENERATION      TSH 3RD GENERATION      METABOLIC PANEL, COMPREHENSIVE      CBC WITH AUTOMATED DIFF      9. Fall, initial encounter  [address] XXXA E888.9 XR 5TH FINGER LT MIN 2 V      10. Finger deformity, left  M20.002 736.20 XR 5TH FINGER LT MIN 2 V      11. Screening for colon cancer  Z12.11 V76.51 REFERRAL TO GASTROENTEROLOGY      12. Family history of colon cancer in mother  Z80.0 V16.0 REFERRAL TO GASTROENTEROLOGY          Refer to Dr. Cate Justice for screening colonoscopy. Will obtain imaging of finger to r/o acute fx; recommend vilma tape when working his hands to prevent further injury. Apply ice several times daily; APAP and ibuprofen per package instructions for pain. Medication Side Effects and Warnings were discussed with patient:  yes  Patient Labs were reviewed:  yes  Patient Past Records were reviewed:  yes      Patient aware of plan of care and verbalized understanding. Questions answered. RTC annually or sooner if needed. On this date 09/27/2022 I have spent 45 minutes reviewing previous notes, test results and face to face with the patient discussing the diagnosis and importance of compliance with the treatment plan as well as documenting on the day of the visit.     Cruzito Russo NP

## 2022-09-27 NOTE — PROGRESS NOTES
1. \"Have you been to the ER, urgent care clinic since your last visit? Hospitalized since your last visit? \" No    2. \"Have you seen or consulted any other health care providers outside of the [address] [place] [address] since your last visit? \" No     3. For patients aged 39-70: Has the patient had a colonoscopy / FIT/ Cologuard? Yes - no Care Gap present      If the patient is female:    4. For patients aged 41-77: Has the patient had a mammogram within the past 2 years? NA - based on age or sex      11. For patients aged 21-65: Has the patient had a pap smear? NA - based on age or sex    Chief Complaint   Patient presents with    Establish Care    Finger Swelling     Patient fell a week and a half ago and his pinky on the left hand is still swollen.        Visit Vitals  BP (!) 100/58 (BP 1 Location: Left upper arm, BP Patient Position: Sitting, BP Cuff Size: Adult)   Pulse (!) 58   Resp 19   Ht 6' 1\" (1.854 m)   Wt 153 lb 12.8 oz (69.8 kg)   SpO2 100%   BMI 20.29 kg/m²

## 2022-09-29 LAB
ALBUMIN SERPL-MCNC: 4.3 G/DL (ref 3.8–4.8)
ALBUMIN/GLOB SERPL: 2.2 {RATIO} (ref 1.2–2.2)
ALP SERPL-CCNC: 92 IU/L (ref 44–121)
ALT SERPL-CCNC: 9 IU/L (ref 0–44)
AST SERPL-CCNC: 29 IU/L (ref 0–40)
BASOPHILS # BLD AUTO: 0.1 X10E3/UL (ref 0–0.2)
BASOPHILS NFR BLD AUTO: 1 %
BILIRUB SERPL-MCNC: 0.3 MG/DL (ref 0–1.2)
BUN SERPL-MCNC: 19 MG/DL (ref 8–27)
BUN/CREAT SERPL: 20 (ref 10–24)
CALCIUM SERPL-MCNC: 9 MG/DL (ref 8.6–10.2)
CHLORIDE SERPL-SCNC: 103 MMOL/L (ref 96–106)
CHOLEST SERPL-MCNC: 168 MG/DL (ref 100–199)
CO2 SERPL-SCNC: 22 MMOL/L (ref 20–29)
CREAT SERPL-MCNC: 0.95 MG/DL (ref 0.76–1.27)
EGFR: 90 ML/MIN/1.73
EOSINOPHIL # BLD AUTO: 0.1 X10E3/UL (ref 0–0.4)
EOSINOPHIL NFR BLD AUTO: 2 %
ERYTHROCYTE [DISTWIDTH] IN BLOOD BY AUTOMATED COUNT: 12.1 % (ref 11.6–15.4)
EST. AVERAGE GLUCOSE BLD GHB EST-MCNC: 117 MG/DL
GLOBULIN SER CALC-MCNC: 2 G/DL (ref 1.5–4.5)
GLUCOSE SERPL-MCNC: 85 MG/DL (ref 70–99)
HBA1C MFR BLD: 5.7 % (ref 4.8–5.6)
HCT VFR BLD AUTO: 40.5 % (ref 37.5–51)
HCV AB S/CO SERPL IA: <0.1 S/CO RATIO (ref 0–0.9)
HDLC SERPL-MCNC: 55 MG/DL
HGB BLD-MCNC: 13.9 G/DL (ref 13–17.7)
IMM GRANULOCYTES # BLD AUTO: 0 X10E3/UL (ref 0–0.1)
IMM GRANULOCYTES NFR BLD AUTO: 0 %
LDLC SERPL CALC-MCNC: 89 MG/DL (ref 0–99)
LYMPHOCYTES # BLD AUTO: 3 X10E3/UL (ref 0.7–3.1)
LYMPHOCYTES NFR BLD AUTO: 45 %
MCH RBC QN AUTO: 32.7 PG (ref 26.6–33)
MCHC RBC AUTO-ENTMCNC: 34.3 G/DL (ref 31.5–35.7)
MCV RBC AUTO: 95 FL (ref 79–97)
MONOCYTES # BLD AUTO: 0.4 X10E3/UL (ref 0.1–0.9)
MONOCYTES NFR BLD AUTO: 7 %
NEUTROPHILS # BLD AUTO: 3.1 X10E3/UL (ref 1.4–7)
NEUTROPHILS NFR BLD AUTO: 45 %
PLATELET # BLD AUTO: 229 X10E3/UL (ref 150–450)
POTASSIUM SERPL-SCNC: 4.3 MMOL/L (ref 3.5–5.2)
PROT SERPL-MCNC: 6.3 G/DL (ref 6–8.5)
PSA SERPL-MCNC: 1.5 NG/ML (ref 0–4)
RBC # BLD AUTO: 4.25 X10E6/UL (ref 4.14–5.8)
SODIUM SERPL-SCNC: 140 MMOL/L (ref 134–144)
TRIGL SERPL-MCNC: 140 MG/DL (ref 0–149)
TSH SERPL DL<=0.005 MIU/L-ACNC: 1.31 UIU/ML (ref 0.45–4.5)
VLDLC SERPL CALC-MCNC: 24 MG/DL (ref 5–40)
WBC # BLD AUTO: 6.7 X10E3/UL (ref 3.4–10.8)

## 2022-09-30 NOTE — PROGRESS NOTES
Your diabetes marker is just within the prediabetes range; be sure to follow a diet rich in whole grains, lean meats, and plenty of fruits and veggies--avoiding concentrated sweets and saturated fats. No concerns with your other labs; normal prostate, thyroid, kidneys, livers, electrolytes, and blood counts. Cholesterol levels are excellent!

## 2022-10-03 NOTE — PROGRESS NOTES
Patient verified stating name and date of birth. Kamilla Shaffer informed of lab results and states understanding.

## 2022-10-25 ENCOUNTER — OFFICE VISIT (OUTPATIENT)
Dept: SURGERY | Age: 62
End: 2022-10-25

## 2022-10-25 VITALS
HEART RATE: 67 BPM | TEMPERATURE: 97.7 F | HEIGHT: 73 IN | BODY MASS INDEX: 20.67 KG/M2 | DIASTOLIC BLOOD PRESSURE: 54 MMHG | WEIGHT: 156 LBS | SYSTOLIC BLOOD PRESSURE: 101 MMHG

## 2022-10-25 DIAGNOSIS — Z12.11 ENCOUNTER FOR SCREENING COLONOSCOPY: Primary | ICD-10-CM

## 2022-10-25 NOTE — PROGRESS NOTES
Subjective:      John Hull is an 60 y.o. male who is referred for a screening colonoscopy. Patients last scope was 10 years ago and was normal.  He has issues related to intermittent constipation for the last several years related to his parkinson's treatment. He denies any blood per rectum or pain with bowel movement. He denies any weight loss or abdominal pain. He did have a sister diagnosed with colon ca in her 52's. And his only abdominal/pelvic surgery was a hernia repair. Colon Cancer Screening  Pertinent negatives include no chest pain, no abdominal pain and no shortness of breath. Patient Active Problem List   Diagnosis Code    Parkinson's disease [facility]) G20     Patient Active Problem List    Diagnosis Date Noted    Parkinson's disease ([facility] 09/02/2014     Current Outpatient Medications   Medication Sig Dispense Refill    ascorbic acid (VITAMIN C) 500 mg chewable tablet Take 500 mg by mouth daily. amantadine HCl (SYMMETREL) 100 mg tablet Take 100 mg by mouth two (2) times a day. pramipexole (MIRAPEX) 1 mg tablet Take 1 mg by mouth three (3) times daily. carbidopa-levodopa (SINEMET)  mg per tablet Take 2 tablets by mouth four (4) times daily. FERROUS FUMARATE/VIT BCOMP&C (SUPER B COMPLEX PO) Take 1 tablet by mouth daily. cholecalciferol (VITAMIN D3) (1000 Units /25 mcg) tablet Take 1,000 Units by mouth daily. No Known Allergies  Past Medical History:   Diagnosis Date    Parkinson disease ([facility]      Past Surgical History:   Procedure Laterality Date    HX COLONOSCOPY  2013    HX HERNIA REPAIR       History reviewed. No pertinent family history. Social History     Tobacco Use    Smoking status: Never    Smokeless tobacco: Never   Substance Use Topics    Alcohol use: No     Alcohol/week: 0.0 standard drinks        Review of Systems  Review of Systems   Constitutional:  Negative for chills, fever, malaise/fatigue and weight loss.    HENT:  Negative for congestion and sore throat. Respiratory:  Negative for cough and shortness of breath. Cardiovascular:  Negative for chest pain, orthopnea, claudication and leg swelling. Gastrointestinal:  Positive for constipation. Negative for abdominal pain, blood in stool, diarrhea, heartburn and vomiting. Musculoskeletal:  Negative for back pain, joint pain and neck pain. Skin:  Negative for itching and rash. Neurological:  Positive for tremors. Negative for weakness. Objective:     Visit Vitals  BP (!) 101/54   Pulse 67   Temp 97.7 °F (36.5 °C) (Temporal)   Ht 6' 1\" (1.854 m)   Wt 156 lb (70.8 kg)   BMI 20.58 kg/m²     Physical Exam  Constitutional:       General: He is not in acute distress. Appearance: Normal appearance. He is normal weight. He is not ill-appearing or toxic-appearing. HENT:      Head: Normocephalic and atraumatic. Mouth/Throat:      Mouth: Mucous membranes are moist.      Pharynx: Oropharynx is clear. Eyes:      General: No scleral icterus. Cardiovascular:      Rate and Rhythm: Normal rate. Pulmonary:      Effort: Pulmonary effort is normal. No respiratory distress. Breath sounds: No wheezing. Abdominal:      General: There is no distension. Palpations: Abdomen is soft. Tenderness: There is no abdominal tenderness. Musculoskeletal:         General: No swelling. Normal range of motion. Cervical back: Normal range of motion and neck supple. Lymphadenopathy:      Cervical: No cervical adenopathy. Skin:     General: Skin is warm. Coloration: Skin is not jaundiced. Neurological:      General: No focal deficit present. Mental Status: He is alert and oriented to person, place, and time. Comments: Tardive dyskinesia        Assessment/Plan:   60year old requiring a screening colonoscopy     Colonoscopy is indicated as noted above and we will proceed to colonoscopy.     The risks(bleeding, abdominal pain, perforation, etc.)  and benefits of my recommendations, as well as other treatment options were discussed with the patient today. Questions were answered. Prep is prescribed per orders. The patient was counseled at length about the risks of jovanni Covid-19 during their perioperative period and any recovery window from their procedure. The patient was made aware that jovanni Covid-19  may worsen their prognosis for recovering from their procedure and lend to a higher morbidity and/or mortality risk. All material risks, benefits, and reasonable alternatives including postponing the procedure were discussed. The patient does wish to proceed with the procedure at this time.

## 2022-10-25 NOTE — PROGRESS NOTES
Identified pt with two pt identifiers (name and ). Reviewed chart in preparation for visit and have obtained necessary documentation. Valentina Cushing is a 60 y.o. male  Chief Complaint   Patient presents with    Colon Cancer Screening     There were no vitals taken for this visit. 1. Have you been to the ER, urgent care clinic since your last visit? Hospitalized since your last visit? No    2. Have you seen or consulted any other health care providers outside of the [address] [place] [address] since your last visit? Include any pap smears or colon screening.  No     BP was checked twice and his diastolic number remained in the 50's  looking back at his vital signs this is the baseline for his diastolic number

## 2022-10-26 ENCOUNTER — HOSPITAL ENCOUNTER (EMERGENCY)
Age: 62
Discharge: HOME | End: 2022-10-26
Attending: EMERGENCY MEDICINE
Payer: COMMERCIAL

## 2022-10-26 VITALS
DIASTOLIC BLOOD PRESSURE: 62 MMHG | BODY MASS INDEX: 21.87 KG/M2 | HEART RATE: 60 BPM | SYSTOLIC BLOOD PRESSURE: 130 MMHG | WEIGHT: 165 LBS | OXYGEN SATURATION: 100 % | RESPIRATION RATE: 18 BRPM | TEMPERATURE: 98.9 F | HEIGHT: 73 IN

## 2022-10-26 DIAGNOSIS — R42 LIGHTHEADEDNESS: Primary | ICD-10-CM

## 2022-10-26 LAB
ANION GAP SERPL CALC-SCNC: 6 MMOL/L (ref 5–15)
APPEARANCE UR: CLEAR
BACTERIA URNS QL MICRO: NEGATIVE /HPF
BASOPHILS # BLD: 0 K/UL (ref 0–0.1)
BASOPHILS NFR BLD: 0 % (ref 0–1)
BILIRUB UR QL: NEGATIVE
BUN SERPL-MCNC: 29 MG/DL (ref 6–20)
BUN/CREAT SERPL: 30 (ref 12–20)
CALCIUM SERPL-MCNC: 8.7 MG/DL (ref 8.5–10.1)
CHLORIDE SERPL-SCNC: 105 MMOL/L (ref 97–108)
CO2 SERPL-SCNC: 30 MMOL/L (ref 21–32)
COLOR UR: NORMAL
CREAT SERPL-MCNC: 0.96 MG/DL (ref 0.7–1.3)
DIFFERENTIAL METHOD BLD: ABNORMAL
EOSINOPHIL # BLD: 0.1 K/UL (ref 0–0.4)
EOSINOPHIL NFR BLD: 2 % (ref 0–7)
EPITH CASTS URNS QL MICRO: NORMAL /LPF
ERYTHROCYTE [DISTWIDTH] IN BLOOD BY AUTOMATED COUNT: 12.4 % (ref 11.5–14.5)
GLUCOSE SERPL-MCNC: 99 MG/DL (ref 65–100)
GLUCOSE UR STRIP.AUTO-MCNC: NEGATIVE MG/DL
HCT VFR BLD AUTO: 38.9 % (ref 36.6–50.3)
HGB BLD-MCNC: 12.9 G/DL (ref 12.1–17)
HGB UR QL STRIP: NEGATIVE
IMM GRANULOCYTES # BLD AUTO: 0 K/UL (ref 0–0.04)
IMM GRANULOCYTES NFR BLD AUTO: 0 % (ref 0–0.5)
KETONES UR QL STRIP.AUTO: NEGATIVE MG/DL
LEUKOCYTE ESTERASE UR QL STRIP.AUTO: NEGATIVE
LYMPHOCYTES # BLD: 3.5 K/UL (ref 0.8–3.5)
LYMPHOCYTES NFR BLD: 47 % (ref 12–49)
MCH RBC QN AUTO: 31.6 PG (ref 26–34)
MCHC RBC AUTO-ENTMCNC: 33.2 G/DL (ref 30–36.5)
MCV RBC AUTO: 95.3 FL (ref 80–99)
MONOCYTES # BLD: 0.5 K/UL (ref 0–1)
MONOCYTES NFR BLD: 7 % (ref 5–13)
NEUTS SEG # BLD: 3.3 K/UL (ref 1.8–8)
NEUTS SEG NFR BLD: 44 % (ref 32–75)
NITRITE UR QL STRIP.AUTO: NEGATIVE
NRBC # BLD: 0 K/UL (ref 0–0.01)
NRBC BLD-RTO: 0 PER 100 WBC
PH UR STRIP: 6 [PH] (ref 5–8)
PLATELET # BLD AUTO: 225 K/UL (ref 150–400)
PMV BLD AUTO: 9 FL (ref 8.9–12.9)
POTASSIUM SERPL-SCNC: 4.8 MMOL/L (ref 3.5–5.1)
PROT UR STRIP-MCNC: NEGATIVE MG/DL
RBC # BLD AUTO: 4.08 M/UL (ref 4.1–5.7)
RBC #/AREA URNS HPF: NORMAL /HPF (ref 0–5)
SODIUM SERPL-SCNC: 141 MMOL/L (ref 136–145)
SP GR UR REFRACTOMETRY: 1.01 (ref 1–1.03)
UA: UC IF INDICATED,UAUC: NORMAL
UROBILINOGEN UR QL STRIP.AUTO: 0.2 EU/DL (ref 0.2–1)
WBC # BLD AUTO: 7.4 K/UL (ref 4.1–11.1)
WBC URNS QL MICRO: NORMAL /HPF (ref 0–4)

## 2022-10-26 PROCEDURE — 85025 COMPLETE CBC W/AUTO DIFF WBC: CPT

## 2022-10-26 PROCEDURE — 74011250636 HC RX REV CODE- 250/636: Performed by: EMERGENCY MEDICINE

## 2022-10-26 PROCEDURE — 81001 URINALYSIS AUTO W/SCOPE: CPT

## 2022-10-26 PROCEDURE — 36415 COLL VENOUS BLD VENIPUNCTURE: CPT

## 2022-10-26 PROCEDURE — 99284 EMERGENCY DEPT VISIT MOD MDM: CPT

## 2022-10-26 PROCEDURE — 80048 BASIC METABOLIC PNL TOTAL CA: CPT

## 2022-10-26 PROCEDURE — 96360 HYDRATION IV INFUSION INIT: CPT

## 2022-10-26 RX ADMIN — SODIUM CHLORIDE 1000 ML: 9 INJECTION, SOLUTION INTRAVENOUS at 19:05

## 2022-10-26 NOTE — ED NOTES
Received assignment, this RN to assume care. Pt here for low BP, BP currently 134/63. Sitting and standing BPs to be obtained, pt not in room with bed, pt placed in fast track chair.

## 2022-10-26 NOTE — ED PROVIDER NOTES
EMERGENCY DEPARTMENT HISTORY AND PHYSICAL EXAM      Date: 10/26/2022  Patient Name: Mary Weaver    History of Presenting Illness     Chief Complaint   Patient presents with    Hypotension       History Provided By: Patient    HPI: Mary Weaver, 60 y.o. male with PMHx significant for Parkinson's disease, presents ambulatory to the ED with cc of blood pressure. He recently had his blood pressure checked and was told it was low. He reports that he does feel slightly lightheaded when he stands up. Denies any chest pain. No syncope. No abdominal pain. No nausea vomiting or diarrhea. PMHx: Significant for Parkinson's disease  PSHx: Significant for colonoscopy, hernia repair  Social Hx: Non-smoker. Nondrinker. There are no other complaints, changes, or physical findings at this time. PCP: Gavin Pastor NP    No current facility-administered medications on file prior to encounter. Current Outpatient Medications on File Prior to Encounter   Medication Sig Dispense Refill    ascorbic acid (VITAMIN C) 500 mg chewable tablet Take 500 mg by mouth daily. amantadine HCl (SYMMETREL) 100 mg tablet Take 100 mg by mouth two (2) times a day. pramipexole (MIRAPEX) 1 mg tablet Take 1 mg by mouth three (3) times daily. carbidopa-levodopa (SINEMET)  mg per tablet Take 2 tablets by mouth four (4) times daily. FERROUS FUMARATE/VIT BCOMP&C (SUPER B COMPLEX PO) Take 1 tablet by mouth daily. cholecalciferol (VITAMIN D3) (1000 Units /25 mcg) tablet Take 1,000 Units by mouth daily. Past History     Past Medical History:  Past Medical History:   Diagnosis Date    Parkinson disease ([facility] [address]        Past Surgical History:  Past Surgical History:   Procedure Laterality Date    HX COLONOSCOPY  2013    HX HERNIA REPAIR         Family History:  History reviewed. No pertinent family history.     Social History:  Social History     Tobacco Use    Smoking status: Never    Smokeless tobacco: Never   Vaping Use    Vaping Use: Never used   Substance Use Topics    Alcohol use: No     Alcohol/week: 0.0 standard drinks       Allergies:  No Known Allergies      Review of Systems   Review of Systems   Constitutional:  Negative for activity change, chills and fever. HENT:  Negative for congestion and sore throat. Eyes:  Negative for pain and redness. Respiratory:  Negative for cough, chest tightness and shortness of breath. Cardiovascular:  Negative for chest pain and palpitations. Gastrointestinal:  Negative for abdominal pain, diarrhea, nausea and vomiting. Genitourinary:  Negative for dysuria, frequency and urgency. Musculoskeletal:  Negative for back pain and neck pain. Skin:  Negative for rash. Neurological:  Positive for light-headedness. Negative for syncope and headaches. Psychiatric/Behavioral:  Negative for confusion. All other systems reviewed and are negative. Physical Exam   Physical Exam  Vitals and nursing note reviewed. Constitutional:       General: He is not in acute distress. Appearance: He is well-developed. He is not diaphoretic. HENT:      Head: Normocephalic. Nose: Nose normal.      Mouth/Throat:      Pharynx: No oropharyngeal exudate. Eyes:      General: No scleral icterus. Conjunctiva/sclera: Conjunctivae normal.      Pupils: Pupils are equal, round, and reactive to light. Neck:      Thyroid: No thyromegaly. Vascular: No JVD. Trachea: No tracheal deviation. Cardiovascular:      Rate and Rhythm: Normal rate and regular rhythm. Heart sounds: No murmur heard. No friction rub. No gallop. Pulmonary:      Effort: Pulmonary effort is normal. No respiratory distress. Breath sounds: Normal breath sounds. No stridor. No wheezing or rales. Abdominal:      General: Bowel sounds are normal. There is no distension. Palpations: Abdomen is soft. Tenderness: There is no abdominal tenderness.  There is no guarding or rebound. Musculoskeletal:         General: Normal range of motion. Cervical back: Normal range of motion and neck supple. Lymphadenopathy:      Cervical: No cervical adenopathy. Skin:     General: Skin is warm and dry. Findings: No erythema or rash. Neurological:      Mental Status: He is alert and oriented to person, place, and time. Cranial Nerves: No cranial nerve deficit. Motor: No abnormal muscle tone. Coordination: Coordination normal.   Psychiatric:         Behavior: Behavior normal.           Diagnostic Study Results     Labs -     Recent Results (from the past 12 hour(s))   CBC WITH AUTOMATED DIFF    Collection Time: 10/26/22  6:44 PM   Result Value Ref Range    WBC 7.4 4.1 - 11.1 K/uL    RBC 4.08 (L) 4.10 - 5.70 M/uL    HGB 12.9 12.1 - 17.0 g/dL    HCT 38.9 36.6 - 50.3 %    MCV 95.3 80.0 - 99.0 FL    MCH 31.6 26.0 - 34.0 PG    MCHC 33.2 30.0 - 36.5 g/dL    RDW 12.4 11.5 - 14.5 %    PLATELET 733 145 - 744 K/uL    MPV 9.0 8.9 - 12.9 FL    NRBC 0.0 0  WBC    ABSOLUTE NRBC 0.00 0.00 - 0.01 K/uL    NEUTROPHILS 44 32 - 75 %    LYMPHOCYTES 47 12 - 49 %    MONOCYTES 7 5 - 13 %    EOSINOPHILS 2 0 - 7 %    BASOPHILS 0 0 - 1 %    IMMATURE GRANULOCYTES 0 0.0 - 0.5 %    ABS. NEUTROPHILS 3.3 1.8 - 8.0 K/UL    ABS. LYMPHOCYTES 3.5 0.8 - 3.5 K/UL    ABS. MONOCYTES 0.5 0.0 - 1.0 K/UL    ABS. EOSINOPHILS 0.1 0.0 - 0.4 K/UL    ABS. BASOPHILS 0.0 0.0 - 0.1 K/UL    ABS. IMM.  GRANS. 0.0 0.00 - 0.04 K/UL    DF AUTOMATED     METABOLIC PANEL, BASIC    Collection Time: 10/26/22  6:44 PM   Result Value Ref Range    Sodium 141 136 - 145 mmol/L    Potassium 4.8 3.5 - 5.1 mmol/L    Chloride 105 97 - 108 mmol/L    CO2 30 21 - 32 mmol/L    Anion gap 6 5 - 15 mmol/L    Glucose 99 65 - 100 mg/dL    BUN 29 (H) 6 - 20 MG/DL    Creatinine 0.96 0.70 - 1.30 MG/DL    BUN/Creatinine ratio 30 (H) 12 - 20      eGFR >60 >60 ml/min/1.73m2    Calcium 8.7 8.5 - 10.1 MG/DL   URINALYSIS W/ REFLEX CULTURE Collection Time: 10/26/22  7:35 PM    Specimen: Urine   Result Value Ref Range    Color YELLOW/STRAW      Appearance CLEAR CLEAR      Specific gravity 1.010 1.003 - 1.030      pH (UA) 6.0 5.0 - 8.0      Protein Negative NEG mg/dL    Glucose Negative NEG mg/dL    Ketone Negative NEG mg/dL    Bilirubin Negative NEG      Blood Negative NEG      Urobilinogen 0.2 0.2 - 1.0 EU/dL    Nitrites Negative NEG      Leukocyte Esterase Negative NEG      WBC 0-4 0 - 4 /hpf    RBC 0-5 0 - 5 /hpf    Epithelial cells FEW FEW /lpf    Bacteria Negative NEG /hpf    UA:UC IF INDICATED CULTURE NOT INDICATED BY UA RESULT CNI         Radiologic Studies -   No orders to display     CT Results  (Last 48 hours)      None          CXR Results  (Last 48 hours)      None              Medical Decision Making   I am the first provider for this patient. I reviewed the vital signs, available nursing notes, past medical history, past surgical history, family history and social history. Vital Signs-Reviewed the patient's vital signs. Patient Vitals for the past 12 hrs:   Temp Pulse Resp BP SpO2   10/26/22 1942 -- 60 18 130/62 100 %   10/26/22 1907 -- 60 18 123/68 100 %   10/26/22 1824 98.9 °F (37.2 °C) (!) 58 17 134/63 100 %           Records Reviewed: Nursing notes reviewed    Provider Notes (Medical Decision Making):   DDX: Dehydration, UTI, electrolyte abnormality, orthostatic hypotension      CBC, UA and BMP unremarkable. Normal creatinine. Mildly increased BUN/creatinine ratio pointing to dehydration as cause of symptoms. Hydrated with normal saline bolus. Patient feeling much better. Discharge home. ED Course:   Initial assessment performed. The patients presenting problems have been discussed, and they are in agreement with the care plan formulated and outlined with them. I have encouraged them to ask questions as they arise throughout their visit.       Specific return precautions provided as well as instructions to return to the ED should sx worsen at any time. Vital signs stable for discharge. I have also put together some discharge instructions for them that include: 1) educational information regarding their diagnosis, 2) how to care for their diagnosis at home, as well a 3) list of reasons why they would want to return to the ED prior to their follow-up appointment, should their condition change. Critical Care Time:   0    Disposition:  Home    PLAN:  1. Current Discharge Medication List        2. Follow-up Information       Follow up With Specialties Details Why Contact Info    Julianne Cr NP Nurse Practitioner Call in 1 day  [address]  [address] [facility] [phone] [facility]  As needed, If symptoms worsen [address] [facility] [phone]          Return to ED if worse     Diagnosis     Clinical Impression:   1. Lightheadedness              Please note that this dictation was completed with [employer], the computer voice recognition software. Quite often unanticipated grammatical, syntax, homophones, and other interpretive errors are inadvertently transcribed by the computer software. Please disregard these errors. Please excuse any errors that have escaped final proofreading.

## 2022-10-26 NOTE — ED TRIAGE NOTES
Pt was recently checked for bp and was told he had low BP. He reports that he gets dizzy when standing at times. No CP or syncopal episodes.

## 2023-01-11 ENCOUNTER — TELEPHONE (OUTPATIENT)
Dept: SURGERY | Age: 63
End: 2023-01-11

## 2023-01-11 NOTE — TELEPHONE ENCOUNTER
Pt called to reschedule his procedure appt date as he will be out of town. Gave message to surgery scheduler.

## 2023-01-14 ENCOUNTER — HOSPITAL ENCOUNTER (EMERGENCY)
Age: 63
Discharge: HOME | End: 2023-01-14
Attending: EMERGENCY MEDICINE
Payer: COMMERCIAL

## 2023-01-14 ENCOUNTER — APPOINTMENT (OUTPATIENT)
Dept: GENERAL RADIOLOGY | Age: 63
End: 2023-01-14
Attending: EMERGENCY MEDICINE
Payer: COMMERCIAL

## 2023-01-14 VITALS
DIASTOLIC BLOOD PRESSURE: 57 MMHG | TEMPERATURE: 98 F | WEIGHT: 165 LBS | SYSTOLIC BLOOD PRESSURE: 105 MMHG | HEIGHT: 73 IN | RESPIRATION RATE: 16 BRPM | OXYGEN SATURATION: 97 % | HEART RATE: 81 BPM | BODY MASS INDEX: 21.87 KG/M2

## 2023-01-14 DIAGNOSIS — S22.41XA CLOSED FRACTURE OF MULTIPLE RIBS OF RIGHT SIDE, INITIAL ENCOUNTER: Primary | ICD-10-CM

## 2023-01-14 PROCEDURE — 71101 X-RAY EXAM UNILAT RIBS/CHEST: CPT

## 2023-01-14 PROCEDURE — 99283 EMERGENCY DEPT VISIT LOW MDM: CPT

## 2023-01-14 RX ORDER — HYDROCODONE BITARTRATE AND ACETAMINOPHEN 5; 325 MG/1; MG/1
1 TABLET ORAL
Qty: 12 TABLET | Refills: 0 | Status: SHIPPED | OUTPATIENT
Start: 2023-01-14 | End: 2023-01-17

## 2023-01-14 NOTE — ED PROVIDER NOTES
EMERGENCY DEPARTMENT HISTORY AND PHYSICAL EXAM      Date: 1/14/2023  Patient Name: Penelope Curry    History of Presenting Illness     Chief Complaint   Patient presents with    Fall       History Provided By: Patient    HPI: Penelope Curry, 58 y.o. male with past medical history listed below, presents via private vehicle to the ED with cc of. Patient reports 2 days ago he slipped and fell down his outside deck steps. Lili Faes down about 3 or 4 steps. Landing on his right side. He complains of right sided rib pain. No other significant injuries. Ambulating without difficulty/at baseline. He has a history of Parkinson's disease and has a history of gait instability. He denies any shortness of breath. No fevers or chills. No nausea or vomiting. There are no other complaints, changes, or physical findings at this time. PCP: Kalyani Chen NP    No current facility-administered medications on file prior to encounter. Current Outpatient Medications on File Prior to Encounter   Medication Sig Dispense Refill    ascorbic acid (VITAMIN C) 500 mg chewable tablet Take 500 mg by mouth daily. amantadine HCl (SYMMETREL) 100 mg tablet Take 100 mg by mouth two (2) times a day. pramipexole (MIRAPEX) 1 mg tablet Take 1 mg by mouth three (3) times daily. carbidopa-levodopa (SINEMET)  mg per tablet Take 2 tablets by mouth four (4) times daily. FERROUS FUMARATE/VIT BCOMP&C (SUPER B COMPLEX PO) Take 1 tablet by mouth daily. cholecalciferol (VITAMIN D3) (1000 Units /25 mcg) tablet Take 1,000 Units by mouth daily. Past History     Past Medical History:  Past Medical History:   Diagnosis Date    Parkinson disease ([address]        Past Surgical History:  Past Surgical History:   Procedure Laterality Date    HX COLONOSCOPY  2013    HX HERNIA REPAIR         Family History:  History reviewed. No pertinent family history.     Social History:  Social History     Tobacco Use    Smoking status: Never    Smokeless tobacco: Never   Vaping Use    Vaping Use: Never used   Substance Use Topics    Alcohol use: No     Alcohol/week: 0.0 standard drinks    Drug use: Never       Allergies:  No Known Allergies      Review of Systems   Review of Systems   Constitutional:  Negative for activity change, chills and fever. HENT:  Negative for congestion and sore throat. Eyes:  Negative for pain and redness. Respiratory:  Negative for cough, chest tightness and shortness of breath. Cardiovascular:  Negative for palpitations. Gastrointestinal:  Negative for abdominal pain, diarrhea, nausea and vomiting. Genitourinary:  Negative for dysuria, frequency and urgency. Musculoskeletal:  Negative for back pain and neck pain. Skin:  Negative for rash. Neurological:  Negative for syncope, light-headedness and headaches. Psychiatric/Behavioral:  Negative for confusion. All other systems reviewed and are negative. Physical Exam     Physical Exam  Constitutional:       General: He is not in acute distress. Appearance: Normal appearance. He is well-developed. He is not diaphoretic. HENT:      Head: Normocephalic and atraumatic. Eyes:      General: No scleral icterus. Conjunctiva/sclera: Conjunctivae normal.      Pupils: Pupils are equal, round, and reactive to light. Cardiovascular:      Rate and Rhythm: Normal rate and regular rhythm. Pulses: Normal pulses. Heart sounds: Normal heart sounds. Pulmonary:      Effort: Pulmonary effort is normal.      Breath sounds: Normal breath sounds. Comments: Moderate chest tenderness over right inferior anterior lateral ribs. No skin changes. No rash or ecchymosis. Skin intact. No wounds. No crepitus. No skin tenting. There is also some mild tenderness of the inferior right posterior lateral ribs. Chest:      Chest wall: Tenderness present. Musculoskeletal:         General: Normal range of motion.    Skin:     General: Skin is warm and dry. Findings: No rash. Neurological:      Mental Status: He is alert and oriented to person, place, and time. Psychiatric:         Behavior: Behavior normal.             Diagnostic Study Results     Labs -   No results found for this or any previous visit (from the past 12 hour(s)). Radiologic Studies -   XR RIBS RT W PA CXR MIN 3 V   Final Result      Nondisplaced fractures of the right sixth and seventh ribs. No pneumothorax. CT Results  (Last 48 hours)      None          CXR Results  (Last 48 hours)      None              Medical Decision Making   I am the first provider for this patient. I reviewed the vital signs, available nursing notes, past medical history, past surgical history, family history and social history. Vital Signs-Reviewed the patient's vital signs. Patient Vitals for the past 12 hrs:   Temp Pulse Resp BP SpO2   01/14/23 1004 98 °F (36.7 °C) 81 16 (!) 105/57 97 %           Records Reviewed: Nursing notes reviewed    Provider Notes (Medical Decision Making):   DDX: 71-year-old male with history of Parkinson's and gait instability with fall down a few outdoor steps. He landed on his right side. Complains of right inferior lateral ribs. No skin changes. No skin tenting or crepitus. Will obtain chest x-ray and rib films to eval for fracture and possible pneumothorax. No other significant injuries. Patient is otherwise without complaints. Good room air sats at 97%. ED Course:   Initial assessment performed. The patients presenting problems have been discussed, and they are in agreement with the care plan formulated and outlined with them. I have encouraged them to ask questions as they arise throughout their visit. PROGRESS NOTE    Pt reevaluated. Plain films obtained and showed fractures of ribs 6 and 7. No pneumothorax. No flail chest.  Discharge home with short course hydrocodone.   Written by George Mireles MD     Progress note:    Pt noted to be feeling better and ready for discharge. Updated pt and/or family on all final lab and/or  imaging findings. Will follow up as instructed. All questions have been answered, pt voiced understanding and agreement with plan. Specific return precautions provided as well as instructions to return to the ED should sx worsen at any time. Vital signs stable for discharge. I have also put together some discharge instructions for them that include: 1) educational information regarding their diagnosis, 2) how to care for their diagnosis at home, as well a 3) list of reasons why they would want to return to the ED prior to their follow-up appointment, should their condition change. Written by Jeb Ortiz MD        Critical Care Time:   0    Disposition:  Discharge    PLAN:  1. Current Discharge Medication List        START taking these medications    Details   HYDROcodone-acetaminophen (Norco) 5-325 mg per tablet Take 1 Tablet by mouth every four (4) hours as needed for Pain for up to 3 days. Max Daily Amount: 6 Tablets. Qty: 12 Tablet, Refills: 0  Start date: 1/14/2023, End date: 1/17/2023    Associated Diagnoses: Closed fracture of multiple ribs of right side, initial encounter           2. Follow-up Information       Follow up With Specialties Details Why Contact Info    Javed Allen NP Nurse Practitioner Schedule an appointment as soon as possible for a visit in 1 week  [address] [place] [address] [address] [facility]      [address] [facility] Go in 1 day If symptoms worsen [address] [facility] [address] [phone]          Return to ED if worse     Diagnosis     Clinical Impression:   1. Closed fracture of multiple ribs of right side, initial encounter              Please note that this dictation was completed with [employer], the [employer] voice recognition software.   Quite often unanticipated grammatical, syntax, homophones, and other interpretive errors are inadvertently transcribed by the computer software. Please disregard these errors. Please excuse any errors that have escaped final proofreading.

## 2023-01-14 NOTE — ED TRIAGE NOTES
Pt reports trip and fall 2 days prior with right anterior and posterior rib pain .  Denies LOC , neck or back pain

## 2023-01-14 NOTE — ED NOTES
Written and verbal discharge instructions reviewed with patient. All discharge medications reviewed and explained. Understanding verbalized, all questions answered.  Ambulated out

## 2023-04-23 ENCOUNTER — APPOINTMENT (OUTPATIENT)
Dept: GENERAL RADIOLOGY | Age: 63
End: 2023-04-23
Attending: EMERGENCY MEDICINE
Payer: MEDICARE

## 2023-04-23 ENCOUNTER — HOSPITAL ENCOUNTER (EMERGENCY)
Age: 63
Discharge: HOME | End: 2023-04-23
Attending: EMERGENCY MEDICINE
Payer: MEDICARE

## 2023-04-23 VITALS
OXYGEN SATURATION: 99 % | TEMPERATURE: 98 F | RESPIRATION RATE: 20 BRPM | HEART RATE: 78 BPM | BODY MASS INDEX: 21.77 KG/M2 | WEIGHT: 165 LBS | DIASTOLIC BLOOD PRESSURE: 59 MMHG | SYSTOLIC BLOOD PRESSURE: 105 MMHG

## 2023-04-23 DIAGNOSIS — S61.239A PUNCTURE WOUND OF FINGER OF LEFT HAND, INITIAL ENCOUNTER: Primary | ICD-10-CM

## 2023-04-23 PROCEDURE — 99284 EMERGENCY DEPT VISIT MOD MDM: CPT

## 2023-04-23 PROCEDURE — 90715 TDAP VACCINE 7 YRS/> IM: CPT | Performed by: EMERGENCY MEDICINE

## 2023-04-23 PROCEDURE — 73140 X-RAY EXAM OF FINGER(S): CPT

## 2023-04-23 PROCEDURE — 75810000293 HC SIMP/SUPERF WND  RPR

## 2023-04-23 PROCEDURE — 74011250636 HC RX REV CODE- 250/636: Performed by: EMERGENCY MEDICINE

## 2023-04-23 PROCEDURE — 90471 IMMUNIZATION ADMIN: CPT

## 2023-04-23 RX ADMIN — TETANUS TOXOID, REDUCED DIPHTHERIA TOXOID AND ACELLULAR PERTUSSIS VACCINE, ADSORBED 0.5 ML: 5; 2.5; 8; 8; 2.5 SUSPENSION INTRAMUSCULAR at 20:04

## 2023-04-24 NOTE — DISCHARGE INSTRUCTIONS
There was no signs of bony involvement.      You can ise the area    A layer of Dermabond was applied which is glue for the skin and will peel off over the next 7-10 days     Do not place any antibiotic ointment on the Dermabond as this will dissolve the glue

## 2023-04-24 NOTE — ED PROVIDER NOTES
[facility] EMERGENCY DEP  EMERGENCY DEPARTMENT ENCOUNTER       Pt Name: Jarred Olosn  MRN: [id number]  [place] 1960  Date of evaluation: 4/23/2023  Provider: Sridhar Chavez DO   PCP: Deya Glover NP  Note Started: 9:32 PM 4/23/23     CHIEF COMPLAINT       Chief Complaint   Patient presents with    Finger Pain        HISTORY OF PRESENT ILLNESS: 1 or more elements      History From: patient, History limited by: none     Jarred Olson is a 58 y.o. male presents to the emergency department by private vehicle for evaluation of puncture wound to the finger. Patient states he was working with a drill and screw to screw down into the hand and the left second digit. He states he had to reverse it to get it out. He states his pain is mild in severity and they said initially there was blood loss but now currently no blood loss. He denies any other pain or injury tetanus shot is not up-to-date it was a clean screw that he was using. Please See [facility] for Additional Details of the HPI/PMH  Nursing Notes were all reviewed and agreed with or any disagreements were addressed in the HPI. REVIEW OF SYSTEMS        Positives and Pertinent negatives as per HPI. PAST HISTORY     Past Medical History:  Past Medical History:   Diagnosis Date    Parkinson disease [facility])        Past Surgical History:  Past Surgical History:   Procedure Laterality Date    HX COLONOSCOPY  2013    HX HERNIA REPAIR         Family History:  No family history on file.     Social History:  Social History     Tobacco Use    Smoking status: Never    Smokeless tobacco: Never   Vaping Use    Vaping Use: Never used   Substance Use Topics    Alcohol use: No     Alcohol/week: 0.0 standard drinks    Drug use: Never       Allergies:  No Known Allergies    CURRENT MEDICATIONS      Discharge Medication List as of 4/23/2023  8:15 PM        CONTINUE these medications which have NOT CHANGED    Details   ascorbic acid (VITAMIN C) 500 mg chewable tablet Take 500 mg by mouth daily. , Historical Med      amantadine HCl (SYMMETREL) 100 mg tablet Take 100 mg by mouth two (2) times a day., Historical Med      pramipexole (MIRAPEX) 1 mg tablet Take 1 mg by mouth three (3) times daily. , Historical Med      carbidopa-levodopa (SINEMET)  mg per tablet Take 2 tablets by mouth four (4) times daily. , Historical Med      FERROUS FUMARATE/VIT BCOMP&C (SUPER B COMPLEX PO) Take 1 tablet by mouth daily. , Historical Med      cholecalciferol (VITAMIN D3) (1000 Units /25 mcg) tablet Take 1,000 Units by mouth daily. , Historical Med             SCREENINGS               No data recorded         PHYSICAL EXAM      ED Triage Vitals [04/23/23 1930]   ED Encounter Vitals Group      BP (!) 105/59      Pulse (Heart Rate) 78      Resp Rate 20      Temp 98 °F (36.7 °C)      Temp src       O2 Sat (%) 99 %      Weight 165 lb      Height         Physical Exam  Vitals and nursing note reviewed. Constitutional:       General: He is not in acute distress. Appearance: He is well-developed. He is not diaphoretic. HENT:      Head: Normocephalic and atraumatic. Mouth/Throat:      Pharynx: No oropharyngeal exudate. Eyes:      Extraocular Movements: Extraocular movements intact. Conjunctiva/sclera: Conjunctivae normal.      Pupils: Pupils are equal, round, and reactive to light. Neck:      Vascular: No JVD. Trachea: No tracheal deviation. Cardiovascular:      Rate and Rhythm: Normal rate and regular rhythm. Pulmonary:      Effort: Pulmonary effort is normal. No respiratory distress. Breath sounds: Normal breath sounds. No stridor. Musculoskeletal:         General: Normal range of motion. Hands:       Cervical back: Normal range of motion and neck supple. Comments: Small puncture wound right second finger within the distal phalanx there is some surrounding ecchymosis. Skin:     General: Skin is warm and dry.       Capillary Refill: Capillary refill takes less than 2 seconds. Neurological:      Mental Status: He is alert and oriented to person, place, and time. Cranial Nerves: No cranial nerve deficit. Comments: No gross motor or sensory deficits    Psychiatric:         Mood and Affect: Mood normal.         Behavior: Behavior normal.        DIAGNOSTIC RESULTS   LABS:  none     EKG: none     RADIOLOGY:  Non-plain film images such as CT, Ultrasound and MRI are read by the radiologist. Plain radiographic images are visualized and preliminarily interpreted by the ED Provider with the findings documented in the MDM section. Interpretation per the Radiologist below, if available at the time of this note:     XR 2ND FINGER LT MIN 2 V    Result Date: 4/23/2023  INDICATION: drilled screw into finger Three views of the left second finger demonstrate no fracture, dislocation, or significant degenerative disease. The soft tissues are unremarkable. No acute finding. PROCEDURES   Unless otherwise noted below, none  Procedures     CRITICAL CARE TIME   none    EMERGENCY DEPARTMENT COURSE and DIFFERENTIAL DIAGNOSIS/MDM   Vitals:    Vitals:    04/23/23 1930   BP: (!) 105/59   Pulse: 78   Resp: 20   Temp: 98 °F (36.7 °C)   SpO2: 99%   Weight: 74.8 kg (165 lb)        Patient was given the following medications:  Medications   diph,Pertuss(AC),Tet Vac-PF (BOOSTRIX) suspension 0.5 mL (0.5 mL IntraMUSCular Given 4/23/23 2004)       Medical Decision Making  Amount and/or Complexity of Data Reviewed  Radiology: ordered. Tetanus shot was up-dated    8:04pm  Finger puncture ~3mm Initially finger was allowed to soak in saline and chlorhexidine. No active bleeding a small area of Dermabond was applied in layer fashion to the area hemostasis achieved. Time spent 2 minutes, tolerated well no complications           FINAL IMPRESSION     1.  Puncture wound of finger of left hand, initial encounter          DISPOSITION/PLAN   Claudette Tripp's  results have been reviewed with him.  He has been counseled regarding his diagnosis, treatment, and plan. He verbally conveys understanding and agreement of the signs, symptoms, diagnosis, treatment and prognosis and additionally agrees to follow up as discussed. He also agrees with the care-plan and conveys that all of his questions have been answered. I have also provided discharge instructions for him that include: educational information regarding their diagnosis and treatment, and list of reasons why they would want to return to the ED prior to their follow-up appointment, should his condition change. CLINICAL IMPRESSION    Discharge Note: The patient is stable for discharge home. The signs, symptoms, diagnosis, and discharge instructions have been discussed, understanding conveyed, and agreed upon. The patient is to follow up as recommended or return to ER should their symptoms worsen. PATIENT REFERRED TO:  Follow-up Information       Follow up With Specialties Details Why [address] [facility], [facility] [place] Jose Manuel, NP Nurse Practitioner   [address]ict[address] Al[address]  [address]  [phone]                DISCHARGE MEDICATIONS:  Discharge Medication List as of 4/23/2023  8:15 PM            DISCONTINUED MEDICATIONS:  Discharge Medication List as of 4/23/2023  8:15 PM          I am the Primary Clinician of Record. Real Jolley DO (electronically signed)    (Please note that parts of this dictation were completed with voice recognition software. Quite often unanticipated grammatical, syntax, homophones, and other interpretive errors are inadvertently transcribed by the computer software. Please disregards these errors.  Please excuse any errors that have escaped final proofreading.)

## 2023-05-21 RX ORDER — AMANTADINE HYDROCHLORIDE 100 MG/1
100 TABLET ORAL 2 TIMES DAILY
COMMUNITY

## 2023-05-21 RX ORDER — PRAMIPEXOLE DIHYDROCHLORIDE 1 MG/1
1 TABLET ORAL 3 TIMES DAILY
COMMUNITY

## 2023-06-06 ENCOUNTER — TELEPHONE (OUTPATIENT)
Age: 63
End: 2023-06-06

## 2024-02-04 ENCOUNTER — HOSPITAL ENCOUNTER (EMERGENCY)
Facility: HOSPITAL | Age: 64
Discharge: HOME | End: 2024-02-04
Attending: EMERGENCY MEDICINE
Payer: MEDICARE

## 2024-02-04 ENCOUNTER — APPOINTMENT (OUTPATIENT)
Facility: HOSPITAL | Age: 64
End: 2024-02-04
Payer: MEDICARE

## 2024-02-04 VITALS
HEIGHT: 73 IN | TEMPERATURE: 97.5 F | WEIGHT: 165 LBS | HEART RATE: 82 BPM | OXYGEN SATURATION: 97 % | DIASTOLIC BLOOD PRESSURE: 62 MMHG | RESPIRATION RATE: 16 BRPM | SYSTOLIC BLOOD PRESSURE: 99 MMHG | BODY MASS INDEX: 21.87 KG/M2

## 2024-02-04 DIAGNOSIS — Z23 NEED FOR DIPHTHERIA-TETANUS-PERTUSSIS (TDAP) VACCINE: ICD-10-CM

## 2024-02-04 DIAGNOSIS — S61.012A THUMB LACERATION, LEFT, INITIAL ENCOUNTER: Primary | ICD-10-CM

## 2024-02-04 PROCEDURE — 6370000000 HC RX 637 (ALT 250 FOR IP): Performed by: EMERGENCY MEDICINE

## 2024-02-04 PROCEDURE — 90715 TDAP VACCINE 7 YRS/> IM: CPT | Performed by: EMERGENCY MEDICINE

## 2024-02-04 PROCEDURE — 99284 EMERGENCY DEPT VISIT MOD MDM: CPT

## 2024-02-04 PROCEDURE — 73140 X-RAY EXAM OF FINGER(S): CPT

## 2024-02-04 PROCEDURE — 90471 IMMUNIZATION ADMIN: CPT | Performed by: EMERGENCY MEDICINE

## 2024-02-04 PROCEDURE — 6360000002 HC RX W HCPCS: Performed by: EMERGENCY MEDICINE

## 2024-02-04 PROCEDURE — 12002 RPR S/N/AX/GEN/TRNK2.6-7.5CM: CPT

## 2024-02-04 PROCEDURE — 2500000003 HC RX 250 WO HCPCS: Performed by: EMERGENCY MEDICINE

## 2024-02-04 RX ORDER — LIDOCAINE HYDROCHLORIDE 20 MG/ML
10 INJECTION, SOLUTION INFILTRATION; PERINEURAL ONCE
Status: COMPLETED | OUTPATIENT
Start: 2024-02-04 | End: 2024-02-04

## 2024-02-04 RX ORDER — IBUPROFEN 200 MG
TABLET ORAL
Status: COMPLETED | OUTPATIENT
Start: 2024-02-04 | End: 2024-02-04

## 2024-02-04 RX ADMIN — TETANUS TOXOID, REDUCED DIPHTHERIA TOXOID AND ACELLULAR PERTUSSIS VACCINE, ADSORBED 0.5 ML: 5; 2.5; 8; 8; 2.5 SUSPENSION INTRAMUSCULAR at 19:35

## 2024-02-04 RX ADMIN — BACITRACIN ZINC, NEOMYCIN, POLYMYXIN B SULFAT 1 EACH: 5000; 3.5; 4 OINTMENT TOPICAL at 20:24

## 2024-02-04 RX ADMIN — LIDOCAINE HYDROCHLORIDE 10 ML: 20 INJECTION, SOLUTION INFILTRATION; PERINEURAL at 19:34

## 2024-02-04 ASSESSMENT — PAIN SCALES - GENERAL
PAINLEVEL_OUTOF10: 7
PAINLEVEL_OUTOF10: 0

## 2024-02-05 ENCOUNTER — TELEPHONE (OUTPATIENT)
Age: 64
End: 2024-02-05

## 2024-02-05 NOTE — TELEPHONE ENCOUNTER
Pt called per Rekha / mario stating he was at the ER on yesterday for a finger cut injury.  Stitches were done, dressing is turning red, so needed an appointment for today for dressing change.  Per Rekha no openings for today to schedule, Kati is out.  DAPHNE Stewart, yes unable to schedule for today, if unable to change dressings or has any concerns, should return back to the ER.  Pt was transferred to Rekha, would like an appointment for another day this week, OK per Rekha.

## 2024-02-05 NOTE — ED PROVIDER NOTES
[facility] EMERGENCY DEP  EMERGENCY DEPARTMENT ENCOUNTER       Pt Name: Nain Muñoz  MRN: [id number]  Birthdate 1960  Date of evaluation: 2/4/2024  Provider: Karen Hernandez MD   PCP: Kati Benson APRN - NP  Note Started: 9:06 PM EST 2/4/24     CHIEF COMPLAINT       Chief Complaint   Patient presents with    Laceration        HISTORY OF PRESENT ILLNESS: 1 or more elements      History From: Patient  HPI Limitations: None     Nain Muñoz is a 64 y.o. male with history of Parkinson's disease who presents to the ED with laceration to his left thumb.  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  He is right-handed.  This happened just prior to arrival.  He does not remember when his last tetanus shot was.  He reports good sensation and he is still able to move his thumb.     REVIEW OF SYSTEMS      Review of Systems     Positives and Pertinent negatives as per HPI.    PAST HISTORY     Past Medical History:  Past Medical History:   Diagnosis Date    Parkinson disease (HCC)          Past Surgical History:  Past Surgical History:   Procedure Laterality Date    COLONOSCOPY  2013    HERNIA REPAIR         Family History:  No family history on file.    Social History:  Social History     Tobacco Use    Smoking status: Never    Smokeless tobacco: Never   Substance Use Topics    Alcohol use: No     Alcohol/week: 0.0 standard drinks of alcohol    Drug use: Never       Allergies:  No Known Allergies    CURRENT MEDICATIONS      Previous Medications    AMANTADINE (SYMMETREL) 100 MG TABS TABLET    Take 100 mg by mouth 2 times daily    ASCORBIC ACID 500 MG CHEW    Take 500 mg by mouth daily    CARBIDOPA-LEVODOPA (SINEMET)  MG PER TABLET    Take 2 tablets by mouth 4 times daily    PRAMIPEXOLE (MIRAPEX) 1 MG TABLET    Take 1 tablet by mouth 3 times daily    VITAMIN D (CHOLECALCIFEROL) 25 MCG (1000 UT) TABS TABLET    Take 1 tablet by mouth daily

## 2024-02-07 ENCOUNTER — OFFICE VISIT (OUTPATIENT)
Age: 64
End: 2024-02-07
Payer: MEDICARE

## 2024-02-07 VITALS
SYSTOLIC BLOOD PRESSURE: 122 MMHG | WEIGHT: 167.2 LBS | DIASTOLIC BLOOD PRESSURE: 58 MMHG | HEIGHT: 73 IN | HEART RATE: 71 BPM | RESPIRATION RATE: 20 BRPM | BODY MASS INDEX: 22.16 KG/M2 | TEMPERATURE: 97.7 F | OXYGEN SATURATION: 97 %

## 2024-02-07 DIAGNOSIS — T14.8XXD ENCOUNTER FOR RE-CHECK OF LACERATION WOUND: Primary | ICD-10-CM

## 2024-02-07 DIAGNOSIS — S61.012D LACERATION OF LEFT THUMB WITHOUT FOREIGN BODY WITHOUT DAMAGE TO NAIL, SUBSEQUENT ENCOUNTER: ICD-10-CM

## 2024-02-07 PROCEDURE — 1123F ACP DISCUSS/DSCN MKR DOCD: CPT

## 2024-02-07 PROCEDURE — 99213 OFFICE O/P EST LOW 20 MIN: CPT

## 2024-02-07 SDOH — ECONOMIC STABILITY: FOOD INSECURITY: WITHIN THE PAST 12 MONTHS, THE FOOD YOU BOUGHT JUST DIDN'T LAST AND YOU DIDN'T HAVE MONEY TO GET MORE.: NEVER TRUE

## 2024-02-07 SDOH — ECONOMIC STABILITY: FOOD INSECURITY: WITHIN THE PAST 12 MONTHS, YOU WORRIED THAT YOUR FOOD WOULD RUN OUT BEFORE YOU GOT MONEY TO BUY MORE.: NEVER TRUE

## 2024-02-07 SDOH — ECONOMIC STABILITY: INCOME INSECURITY: HOW HARD IS IT FOR YOU TO PAY FOR THE VERY BASICS LIKE FOOD, HOUSING, MEDICAL CARE, AND HEATING?: NOT HARD AT ALL

## 2024-02-07 SDOH — ECONOMIC STABILITY: HOUSING INSECURITY
IN THE LAST 12 MONTHS, WAS THERE A TIME WHEN YOU DID NOT HAVE A STEADY PLACE TO SLEEP OR SLEPT IN A SHELTER (INCLUDING NOW)?: NO

## 2024-02-07 ASSESSMENT — PATIENT HEALTH QUESTIONNAIRE - PHQ9
SUM OF ALL RESPONSES TO PHQ QUESTIONS 1-9: 0
SUM OF ALL RESPONSES TO PHQ9 QUESTIONS 1 & 2: 0
2. FEELING DOWN, DEPRESSED OR HOPELESS: 0
1. LITTLE INTEREST OR PLEASURE IN DOING THINGS: 0
SUM OF ALL RESPONSES TO PHQ QUESTIONS 1-9: 0

## 2024-02-07 NOTE — PROGRESS NOTES
\"Have you been to the ER, urgent care clinic since your last visit?  Hospitalized since your last visit?\"    NO    “Have you seen or consulted any other health care providers outside of [facility] since your last visit?”    NO    “Have you had a colorectal cancer screening such as a colonoscopy/FIT/Cologuard?    Over 10 + yrs ago         Chief Complaint   Patient presents with    Wound Check       Vitals:    02/07/24 1348   BP: (!) 122/58   Pulse: 71   Resp: 20   Temp: 97.7 °F (36.5 °C)   SpO2: 97%

## 2024-02-07 NOTE — PROGRESS NOTES
Chief Complaint   Patient presents with    Wound Check       HPI:     is a 64 y.o. male who presents for an ER follow-up.      He was seen at [facility] ([place], [place]) ER three days ago after sustaining a thumb laceration while using a table saw; imaging showed soft tissue involvement only and the wound was sutured closed.  He denies increased pain, redness,swelling, or drainage.    No Known Allergies    Current Outpatient Medications   Medication Sig Dispense Refill    Amantadine (SYMMETREL) 100 MG TABS tablet Take 100 mg by mouth 2 times daily      Ascorbic Acid 500 MG CHEW Take 500 mg by mouth daily      carbidopa-levodopa (SINEMET)  MG per tablet Take 2 tablets by mouth 4 times daily      vitamin D (CHOLECALCIFEROL) 25 MCG (1000 UT) TABS tablet Take 1 tablet by mouth daily      pramipexole (MIRAPEX) 1 MG tablet Take 1 tablet by mouth 3 times daily       No current facility-administered medications for this visit.         Past Medical History:   Diagnosis Date    Parkinson disease        History reviewed. No pertinent family history.    Review of Systems   Constitutional:  Negative for chills, fatigue and fever.   Skin:  Positive for wound.   Neurological:  Positive for tremors. Negative for dizziness and headaches.        Vitals:    02/07/24 1348   BP: (!) 122/58   Site: Left Upper Arm   Position: Sitting   Cuff Size: Medium Adult   Pulse: 71   Resp: 20   Temp: 97.7 °F (36.5 °C)   TempSrc: Oral   SpO2: 97%   Weight: 75.8 kg (167 lb 3.2 oz)   Height: 1.854 m (6' 1\")        Wt Readings from Last 3 Encounters:   02/07/24 75.8 kg (167 lb 3.2 oz)   02/04/24 74.8 kg (165 lb)   11/30/22 74.8 kg (165 lb)           2/7/2024     1:46 PM   PHQ-9    Little interest or pleasure in doing things 0   Feeling down, depressed, or hopeless 0   PHQ-2 Score 0   PHQ-9 Total Score 0        Physical Exam  Vitals and nursing note reviewed.   Constitutional:       General: He is not in acute distress.     Appearance: Normal appearance.   HENT:

## 2024-02-12 ENCOUNTER — OFFICE VISIT (OUTPATIENT)
Age: 64
End: 2024-02-12
Payer: MEDICARE

## 2024-02-12 VITALS
WEIGHT: 165.8 LBS | HEIGHT: 73 IN | TEMPERATURE: 97.5 F | OXYGEN SATURATION: 95 % | HEART RATE: 59 BPM | DIASTOLIC BLOOD PRESSURE: 68 MMHG | BODY MASS INDEX: 21.98 KG/M2 | RESPIRATION RATE: 18 BRPM | SYSTOLIC BLOOD PRESSURE: 122 MMHG

## 2024-02-12 DIAGNOSIS — Z12.5 ENCOUNTER FOR SCREENING FOR MALIGNANT NEOPLASM OF PROSTATE: ICD-10-CM

## 2024-02-12 DIAGNOSIS — S61.012D LACERATION OF LEFT THUMB WITHOUT FOREIGN BODY WITHOUT DAMAGE TO NAIL, SUBSEQUENT ENCOUNTER: ICD-10-CM

## 2024-02-12 DIAGNOSIS — Z12.11 COLON CANCER SCREENING: ICD-10-CM

## 2024-02-12 DIAGNOSIS — Z91.81 AT HIGH RISK FOR FALLS: ICD-10-CM

## 2024-02-12 DIAGNOSIS — G20.B1 PARKINSON'S DISEASE WITH DYSKINESIA, UNSPECIFIED WHETHER MANIFESTATIONS FLUCTUATE: ICD-10-CM

## 2024-02-12 DIAGNOSIS — Z00.00 MEDICARE ANNUAL WELLNESS VISIT, SUBSEQUENT: Primary | ICD-10-CM

## 2024-02-12 DIAGNOSIS — Z48.02 VISIT FOR SUTURE REMOVAL: ICD-10-CM

## 2024-02-12 DIAGNOSIS — R73.03 PREDIABETES: ICD-10-CM

## 2024-02-12 LAB — HBA1C MFR BLD: 5.3 %

## 2024-02-12 PROCEDURE — 83036 HEMOGLOBIN GLYCOSYLATED A1C: CPT

## 2024-02-12 NOTE — PROGRESS NOTES
\"Have you been to the ER, urgent care clinic since your last visit?  Hospitalized since your last visit?\"    NO    “Have you seen or consulted any other health care providers outside of [facility] since your last visit?”    NO    “Have you had a colorectal cancer screening such as a colonoscopy/FIT/Cologuard?    NO        Chief Complaint   Patient presents with    Medicare AWV    Wound Check     Suture removal        Vitals:    02/12/24 1039   BP: 122/68   Pulse: 59   Resp: 18   Temp: 97.5 °F (36.4 °C)   SpO2: 95%

## 2024-02-12 NOTE — ACP (ADVANCE CARE PLANNING)
Discussed importance of advanced medical directives with patient.  Patient is capable of making decisions.    Discussed advanced directives 2/12/2024. [place] advance directive form discussed with pt. Pt will consider options and give us written form back for inclusion in chart.    GRACIE Pirse - NP

## 2024-02-12 NOTE — PROGRESS NOTES
Medicare Annual Wellness Visit    Nain Muñoz is here for Medicare AWV and Wound Check (Suture removal )    Assessment & Plan   Medicare annual wellness visit, subsequent  Parkinson's disease with dyskinesia, unspecified whether manifestations fluctuate  -     CBC with Auto Differential; Future  -     Comprehensive Metabolic Panel; Future  -     External Referral To Neurology  Prediabetes  -     DC COLLECTION VENOUS BLOOD VENIPUNCTURE  -     AMB POC HEMOGLOBIN A1C  Encounter for screening for malignant neoplasm of prostate  -     DC COLLECTION VENOUS BLOOD VENIPUNCTURE  -     PSA Screening; Future  Colon cancer screening  -     Ambulatory referral to General Surgery  At high risk for falls  Laceration of left thumb without foreign body without damage to nail, subsequent encounter  -     SUTURE / STAPLE REMOVAL BY PROVIDER  Visit for suture removal  -     SUTURE / STAPLE REMOVAL BY PROVIDER    Refer to Dr. Gallegos to discuss treatment options.      Ten simple interrupted sutures are removed without difficulty.  Discussed wound care; continue to keep wound clean and dry until skin has healed.      On the basis of positive falls risk screening, assessment and plan is as follows: referral to neurology provided for PD.    Recommendations for Preventive Services Due: see orders and patient instructions/AVS.  Recommended screening schedule for the next 5-10 years is provided to the patient in written form: see Patient Instructions/AVS.     Return in 1 year (on 2/12/2025).     Subjective   The following acute and/or chronic problems were also addressed today:    PD:  He suffers from a prominent resting tremor and balance issues; dyskinesia has worsened over the past couple of years. [facility] whom [facility] 6 months, but is interested in establishing with a new specialist.    New issues:  He was seen at [facility] ([place], [place]) ER eight days ago after sustaining a thumb laceration while using a table saw; imaging showed

## 2024-02-13 LAB
ALBUMIN SERPL-MCNC: 4.1 G/DL (ref 3.9–4.9)
ALBUMIN/GLOB SERPL: 2 {RATIO} (ref 1.2–2.2)
ALP SERPL-CCNC: 92 IU/L (ref 44–121)
ALT SERPL-CCNC: 13 IU/L (ref 0–44)
AST SERPL-CCNC: 24 IU/L (ref 0–40)
BASOPHILS # BLD AUTO: 0 X10E3/UL (ref 0–0.2)
BASOPHILS NFR BLD AUTO: 1 %
BILIRUB SERPL-MCNC: 0.3 MG/DL (ref 0–1.2)
BUN SERPL-MCNC: 18 MG/DL (ref 8–27)
BUN/CREAT SERPL: 21 (ref 10–24)
CALCIUM SERPL-MCNC: 9.1 MG/DL (ref 8.6–10.2)
CO2 SERPL-SCNC: 22 MMOL/L (ref 20–29)
CREAT SERPL-MCNC: 0.87 MG/DL (ref 0.76–1.27)
EGFRCR SERPLBLD CKD-EPI 2021: 96 ML/MIN/1.73
EOSINOPHIL # BLD AUTO: 0.1 X10E3/UL (ref 0–0.4)
EOSINOPHIL NFR BLD AUTO: 1 %
ERYTHROCYTE [DISTWIDTH] IN BLOOD BY AUTOMATED COUNT: 12.2 % (ref 11.6–15.4)
GLOBULIN SER CALC-MCNC: 2.1 G/DL (ref 1.5–4.5)
GLUCOSE SERPL-MCNC: 102 MG/DL (ref 70–99)
HCT VFR BLD AUTO: 39.9 % (ref 37.5–51)
HGB BLD-MCNC: 13.2 G/DL (ref 13–17.7)
IMM GRANULOCYTES # BLD AUTO: 0 X10E3/UL (ref 0–0.1)
IMM GRANULOCYTES NFR BLD AUTO: 0 %
LYMPHOCYTES # BLD AUTO: 2.1 X10E3/UL (ref 0.7–3.1)
LYMPHOCYTES NFR BLD AUTO: 29 %
MCH RBC QN AUTO: 32.6 PG (ref 26.6–33)
MCHC RBC AUTO-ENTMCNC: 33.1 G/DL (ref 31.5–35.7)
MCV RBC AUTO: 99 FL (ref 79–97)
MONOCYTES # BLD AUTO: 0.5 X10E3/UL (ref 0.1–0.9)
MONOCYTES NFR BLD AUTO: 7 %
NEUTROPHILS # BLD AUTO: 4.5 X10E3/UL (ref 1.4–7)
NEUTROPHILS NFR BLD AUTO: 62 %
PLATELET # BLD AUTO: 225 X10E3/UL (ref 150–450)
POTASSIUM SERPL-SCNC: 4.3 MMOL/L (ref 3.5–5.2)
PROT SERPL-MCNC: 6.2 G/DL (ref 6–8.5)
PSA SERPL-MCNC: 2.3 NG/ML (ref 0–4)
RBC # BLD AUTO: 4.05 X10E6/UL (ref 4.14–5.8)
SODIUM SERPL-SCNC: 141 MMOL/L (ref 134–144)
WBC # BLD AUTO: 7.3 X10E3/UL (ref 3.4–10.8)

## 2024-12-06 ENCOUNTER — HOSPITAL ENCOUNTER (EMERGENCY)
Facility: HOSPITAL | Age: 64
Discharge: HOME | End: 2024-12-06
Attending: FAMILY MEDICINE | Admitting: FAMILY MEDICINE
Payer: MEDICARE

## 2024-12-06 VITALS
HEART RATE: 82 BPM | OXYGEN SATURATION: 96 % | HEIGHT: 73 IN | WEIGHT: 165 LBS | TEMPERATURE: 98.1 F | BODY MASS INDEX: 21.87 KG/M2 | SYSTOLIC BLOOD PRESSURE: 157 MMHG | DIASTOLIC BLOOD PRESSURE: 82 MMHG | RESPIRATION RATE: 20 BRPM

## 2024-12-06 DIAGNOSIS — G20.A2 PARKINSON'S DISEASE WITH FLUCTUATING MANIFESTATIONS, UNSPECIFIED WHETHER DYSKINESIA PRESENT (HCC): Primary | ICD-10-CM

## 2024-12-06 PROCEDURE — 99283 EMERGENCY DEPT VISIT LOW MDM: CPT

## 2024-12-06 PROCEDURE — 6370000000 HC RX 637 (ALT 250 FOR IP): Performed by: FAMILY MEDICINE

## 2024-12-06 RX ORDER — CARBIDOPA AND LEVODOPA 25; 100 MG/1; MG/1
2 TABLET ORAL ONCE
Status: DISCONTINUED | OUTPATIENT
Start: 2024-12-06 | End: 2024-12-06 | Stop reason: SDUPTHER

## 2024-12-06 RX ORDER — CARBIDOPA AND LEVODOPA 25; 100 MG/1; MG/1
2 TABLET ORAL 4 TIMES DAILY
Qty: 80 TABLET | Refills: 0 | Status: SHIPPED | OUTPATIENT
Start: 2024-12-06 | End: 2024-12-16

## 2024-12-06 RX ORDER — CARBIDOPA AND LEVODOPA 25; 100 MG/1; MG/1
2 TABLET, EXTENDED RELEASE ORAL ONCE
Status: COMPLETED | OUTPATIENT
Start: 2024-12-06 | End: 2024-12-06

## 2024-12-06 RX ADMIN — CARBIDOPA AND LEVODOPA 2 TABLET: 25; 100 TABLET, EXTENDED RELEASE ORAL at 01:34

## 2024-12-06 ASSESSMENT — PAIN - FUNCTIONAL ASSESSMENT: PAIN_FUNCTIONAL_ASSESSMENT: 0-10

## 2024-12-06 ASSESSMENT — PAIN SCALES - GENERAL: PAINLEVEL_OUTOF10: 0

## 2024-12-06 NOTE — ED TRIAGE NOTES
Pt arrived via EMS with reports of Parkinsons tremors increasing due to lack of medication at home. Pt reports that [facility] but that he ran out of his medication yesterday and it was supposed to come in the mail today but did not arrive.

## 2024-12-06 NOTE — ED PROVIDER NOTES
[facility] EMERGENCY DEP  EMERGENCY DEPARTMENT ENCOUNTER       Pt Name: Nain Muñoz  MRN: [id number]  Birthdate 1960  Date of evaluation: 12/6/2024  Provider: Brina Osorio MD   PCP: Kati Benson APRN - NP  Note Started: 1:15 AM EST 12/6/24     CHIEF COMPLAINT       Chief Complaint   Patient presents with    Tremors    Medication Refill        HISTORY OF PRESENT ILLNESS: 1 or more elements      History From: Patient  HPI Limitations: None     Nain Muñoz is a 64 y.o. male who was brought to the ED because of worsening Parkinson's Disease.        Nursing Notes were all reviewed and agreed with or any disagreements were addressed in the HPI.     REVIEW OF SYSTEMS      Review of Systems     Positives and Pertinent negatives as per HPI.    PAST HISTORY     Past Medical History:  Past Medical History:   Diagnosis Date    Parkinson disease (HCC)          Past Surgical History:  Past Surgical History:   Procedure Laterality Date    COLONOSCOPY  2013    HERNIA REPAIR         Family History:  History reviewed. No pertinent family history.    Social History:  Social History     Tobacco Use    Smoking status: Never    Smokeless tobacco: Never   Substance Use Topics    Alcohol use: No     Alcohol/week: 0.0 standard drinks of alcohol    Drug use: Never       Allergies:  Allergies   Allergen Reactions    Ropinirole Rash       CURRENT MEDICATIONS      Previous Medications    AMANTADINE (SYMMETREL) 100 MG TABS TABLET    Take 100 mg by mouth 2 times daily    ASCORBIC ACID 500 MG CHEW    Take 500 mg by mouth daily    CARBIDOPA-LEVODOPA (SINEMET)  MG PER TABLET    Take 2 tablets by mouth 4 times daily    PRAMIPEXOLE (MIRAPEX) 1 MG TABLET    Take 1 tablet by mouth 3 times daily    VITAMIN D (CHOLECALCIFEROL) 25 MCG (1000 UT) TABS TABLET    Take 1 tablet by mouth daily       SCREENINGS               No data recorded        PHYSICAL EXAM      ED Triage Vitals   Encounter Vitals Group      BP       Systolic BP Percentile        Diastolic BP Percentile       Pulse       Resp       Temp       Temp src       SpO2       Weight       Height       Head Circumference       Peak Flow       Pain Score       Pain Loc       Pain Education       Exclude from Growth Chart            Physical Exam  Constitutional:       Appearance: Normal appearance.   HENT:      Head: Normocephalic.      Right Ear: External ear normal.      Left Ear: External ear normal.      Nose: Nose normal.      Mouth/Throat:      Mouth: Mucous membranes are moist.   Cardiovascular:      Rate and Rhythm: Normal rate.   Pulmonary:      Effort: Pulmonary effort is normal.   Musculoskeletal:         General: No swelling or signs of injury.   Skin:     General: Skin is warm and dry.   Neurological:      Mental Status: He is alert and oriented to person, place, and time.      Comments: Resting tremor            PROCEDURES   Unless otherwise noted below, none  Procedures       CRITICAL CARE TIME   Patient does not meet Critical Care Time, 0 minutes     SCREENINGS   NIH Stroke Score       Heart Score       Curb-65          EMERGENCY DEPARTMENT COURSE and DIFFERENTIAL DIAGNOSIS/MDM   Vitals:    Vitals:    12/06/24 0116   BP: (!) 157/82   Pulse: 82   Resp: 20   Temp: 98.1 °F (36.7 °C)   TempSrc: Oral   SpO2: 96%   Weight: 74.8 kg (165 lb)   Height: 1.854 m (6' 1\")            Patient was given the following medications:  Medications   carbidopa-levodopa (SINEMET CR)  MG per extended release tablet 2 tablet (has no administration in time range)       CONSULTS: (Who and What was discussed)  None      CLINICAL MANAGEMENT TOOLS:  Not Applicable    Chronic Conditions: Parkinson's    Social Determinants affecting Dx or Tx: None    Records Reviewed (source and summary of external notes): Nursing Notes and Old Medical Records    CC/HPI Summary, DDx, ED Course, and Reassessment:       Pt reports that he was [facility] to [facility]. He

## 2025-04-25 ENCOUNTER — OFFICE VISIT (OUTPATIENT)
Age: 65
End: 2025-04-25
Payer: MEDICARE

## 2025-04-25 VITALS
WEIGHT: 158 LBS | OXYGEN SATURATION: 96 % | TEMPERATURE: 97.5 F | HEART RATE: 56 BPM | DIASTOLIC BLOOD PRESSURE: 50 MMHG | SYSTOLIC BLOOD PRESSURE: 98 MMHG | BODY MASS INDEX: 20.85 KG/M2

## 2025-04-25 DIAGNOSIS — G20.A2 PARKINSON'S DISEASE WITH FLUCTUATING MANIFESTATIONS, UNSPECIFIED WHETHER DYSKINESIA PRESENT (HCC): ICD-10-CM

## 2025-04-25 DIAGNOSIS — Z00.00 MEDICARE ANNUAL WELLNESS VISIT, SUBSEQUENT: Primary | ICD-10-CM

## 2025-04-25 DIAGNOSIS — N40.1 BENIGN PROSTATIC HYPERPLASIA WITH URINARY FREQUENCY: ICD-10-CM

## 2025-04-25 DIAGNOSIS — R35.0 BENIGN PROSTATIC HYPERPLASIA WITH URINARY FREQUENCY: ICD-10-CM

## 2025-04-25 DIAGNOSIS — Z12.5 ENCOUNTER FOR SCREENING FOR MALIGNANT NEOPLASM OF PROSTATE: ICD-10-CM

## 2025-04-25 DIAGNOSIS — Z91.81 AT HIGH RISK FOR FALLS: ICD-10-CM

## 2025-04-25 DIAGNOSIS — Z13.220 SCREENING, LIPID: ICD-10-CM

## 2025-04-25 LAB
BILIRUBIN, URINE, POC: NEGATIVE
BLOOD URINE, POC: NEGATIVE
GLUCOSE URINE, POC: NEGATIVE
KETONES, URINE, POC: NORMAL
LEUKOCYTE ESTERASE, URINE, POC: NEGATIVE
NITRITE, URINE, POC: NEGATIVE
PH, URINE, POC: 6 (ref 4.6–8)
PROTEIN,URINE, POC: 30
SPECIFIC GRAVITY, URINE, POC: 1.02 (ref 1–1.03)
URINALYSIS CLARITY, POC: CLEAR
URINALYSIS COLOR, POC: YELLOW
UROBILINOGEN, POC: NORMAL

## 2025-04-25 PROCEDURE — 81003 URINALYSIS AUTO W/O SCOPE: CPT

## 2025-04-25 RX ORDER — DUTASTERIDE 0.5 MG/1
0.5 CAPSULE, LIQUID FILLED ORAL DAILY
Qty: 90 CAPSULE | Refills: 0 | Status: SHIPPED | OUTPATIENT
Start: 2025-04-25

## 2025-04-25 SDOH — ECONOMIC STABILITY: FOOD INSECURITY: WITHIN THE PAST 12 MONTHS, YOU WORRIED THAT YOUR FOOD WOULD RUN OUT BEFORE YOU GOT MONEY TO BUY MORE.: NEVER TRUE

## 2025-04-25 SDOH — ECONOMIC STABILITY: FOOD INSECURITY: WITHIN THE PAST 12 MONTHS, THE FOOD YOU BOUGHT JUST DIDN'T LAST AND YOU DIDN'T HAVE MONEY TO GET MORE.: NEVER TRUE

## 2025-04-25 ASSESSMENT — PATIENT HEALTH QUESTIONNAIRE - PHQ9
SUM OF ALL RESPONSES TO PHQ QUESTIONS 1-9: 0
1. LITTLE INTEREST OR PLEASURE IN DOING THINGS: NOT AT ALL
SUM OF ALL RESPONSES TO PHQ QUESTIONS 1-9: 0
2. FEELING DOWN, DEPRESSED OR HOPELESS: NOT AT ALL

## 2025-04-25 ASSESSMENT — LIFESTYLE VARIABLES
HOW OFTEN DO YOU HAVE A DRINK CONTAINING ALCOHOL: NEVER
HOW MANY STANDARD DRINKS CONTAINING ALCOHOL DO YOU HAVE ON A TYPICAL DAY: PATIENT DOES NOT DRINK

## 2025-04-25 NOTE — PROGRESS NOTES
Medicare Annual Wellness Visit    Nain Muñoz is here for Urinary Frequency (X 2 mnths ) and Medicare AWV    Assessment & Plan  1. Urinary frequency: Chronic.  - Symptoms suggest a potential enlargement of the prostate, with increased urgency and frequency, especially at night. Previous trial of Flomax was ineffective after 2 weeks; advised that medication typically requires a longer duration to show effects.  - PSA test will be conducted today to monitor prostate health.  - [facility] [place]. Continue Flomax and add dutasteride, taking both medications at night.  - Limit water intake before bedtime.  - Ensure complete bladder emptying during urination.  - If ineffective, a referral to a urologist will be considered.    2. Medicare wellness.  - Schedule an eye exam.  - Routine labs, including kidney function, liver function, and blood counts, will be performed today.  - Comprehensive lab work will be conducted during this visit.    Follow-up  - PSA test results  - Routine labs results    Medicare annual wellness visit, subsequent  At high risk for falls  Parkinson's disease with fluctuating manifestations, unspecified whether dyskinesia present (HCC)  -     CBC with Auto Differential; Future  -     Comprehensive Metabolic Panel; Future  Benign prostatic hyperplasia with urinary frequency  -     dutasteride (AVODART) 0.5 MG capsule; Take 1 capsule by mouth daily, Disp-90 capsule, R-0Normal  Encounter for screening for malignant neoplasm of prostate  -     COLLECTION VENOUS BLOOD,VENIPUNCTURE  -     PSA Screening; Future  Screening, lipid  -     Lipid Panel; Future    Results         No follow-ups on file.     Subjective   History of Present Illness  The patient is a 65-year-old male here for Medicare wellness and urinary frequency.    Urinary Frequency  Urinary frequency, particularly at night, has been ongoing for approximately one year. Increased urgency is

## 2025-04-25 NOTE — PROGRESS NOTES
Have you been to the ER, urgent care clinic since your last visit?  Hospitalized since your last visit?   NO    Have you seen or consulted any other health care providers outside our system since your last visit?   NO      “Have you had a colorectal cancer screening such as a colonoscopy/FIT/Cologuard?    NO    No colonoscopy on file  No cologuard on file  No FIT/FOBT on file   No flexible sigmoidoscopy on file              Chief Complaint   Patient presents with    Urinary Frequency     X 2 mnths        Vitals:    04/25/25 1425   BP: (!) 98/50   Pulse: 56   Temp: 97.5 °F (36.4 °C)   SpO2: 96%       Labs drawn from right per Kati Benson NP's orders. Patient tolerated well.

## 2025-04-25 NOTE — ACP (ADVANCE CARE PLANNING)
Discussed importance of advanced medical directives with patient.  Patient is capable of making decisions.    Kati Benson, APRN - NP

## 2025-04-26 LAB
ALBUMIN SERPL-MCNC: 4.1 G/DL (ref 3.9–4.9)
ALP SERPL-CCNC: 82 IU/L (ref 44–121)
ALT SERPL-CCNC: 4 IU/L (ref 0–44)
AST SERPL-CCNC: 17 IU/L (ref 0–40)
BASOPHILS # BLD AUTO: 0.1 X10E3/UL (ref 0–0.2)
BASOPHILS NFR BLD AUTO: 1 %
BILIRUB SERPL-MCNC: 0.5 MG/DL (ref 0–1.2)
BUN SERPL-MCNC: 18 MG/DL (ref 8–27)
BUN/CREAT SERPL: 24 (ref 10–24)
CALCIUM SERPL-MCNC: 8.9 MG/DL (ref 8.6–10.2)
CHLORIDE SERPL-SCNC: 102 MMOL/L (ref 96–106)
CHOLEST SERPL-MCNC: 144 MG/DL (ref 100–199)
CO2 SERPL-SCNC: 23 MMOL/L (ref 20–29)
CREAT SERPL-MCNC: 0.74 MG/DL (ref 0.76–1.27)
EGFRCR SERPLBLD CKD-EPI 2021: 100 ML/MIN/1.73
EOSINOPHIL # BLD AUTO: 0 X10E3/UL (ref 0–0.4)
EOSINOPHIL NFR BLD AUTO: 0 %
ERYTHROCYTE [DISTWIDTH] IN BLOOD BY AUTOMATED COUNT: 12.1 % (ref 11.6–15.4)
GLOBULIN SER CALC-MCNC: 2 G/DL (ref 1.5–4.5)
GLUCOSE SERPL-MCNC: 110 MG/DL (ref 70–99)
HCT VFR BLD AUTO: 37.9 % (ref 37.5–51)
HDLC SERPL-MCNC: 52 MG/DL
HGB BLD-MCNC: 12.3 G/DL (ref 13–17.7)
IMM GRANULOCYTES # BLD AUTO: 0 X10E3/UL (ref 0–0.1)
IMM GRANULOCYTES NFR BLD AUTO: 0 %
LDLC SERPL CALC-MCNC: 77 MG/DL (ref 0–99)
LYMPHOCYTES # BLD AUTO: 1.3 X10E3/UL (ref 0.7–3.1)
LYMPHOCYTES NFR BLD AUTO: 19 %
MCH RBC QN AUTO: 31.7 PG (ref 26.6–33)
MCHC RBC AUTO-ENTMCNC: 32.5 G/DL (ref 31.5–35.7)
MCV RBC AUTO: 98 FL (ref 79–97)
MONOCYTES # BLD AUTO: 0.5 X10E3/UL (ref 0.1–0.9)
MONOCYTES NFR BLD AUTO: 8 %
NEUTROPHILS # BLD AUTO: 5 X10E3/UL (ref 1.4–7)
NEUTROPHILS NFR BLD AUTO: 72 %
PLATELET # BLD AUTO: 218 X10E3/UL (ref 150–450)
POTASSIUM SERPL-SCNC: 4.5 MMOL/L (ref 3.5–5.2)
PROT SERPL-MCNC: 6.1 G/DL (ref 6–8.5)
PSA SERPL-MCNC: 1.2 NG/ML (ref 0–4)
RBC # BLD AUTO: 3.88 X10E6/UL (ref 4.14–5.8)
SODIUM SERPL-SCNC: 137 MMOL/L (ref 134–144)
TRIGL SERPL-MCNC: 76 MG/DL (ref 0–149)
VLDLC SERPL CALC-MCNC: 15 MG/DL (ref 5–40)
WBC # BLD AUTO: 6.9 X10E3/UL (ref 3.4–10.8)

## 2025-04-28 ENCOUNTER — RESULTS FOLLOW-UP (OUTPATIENT)
Age: 65
End: 2025-04-28

## 2025-05-01 ENCOUNTER — HOSPITAL ENCOUNTER (OUTPATIENT)
Facility: HOSPITAL | Age: 65
Discharge: HOME | End: 2025-05-01
Payer: MEDICARE

## 2025-05-01 ENCOUNTER — TELEPHONE (OUTPATIENT)
Age: 65
End: 2025-05-01

## 2025-05-01 ENCOUNTER — OFFICE VISIT (OUTPATIENT)
Age: 65
End: 2025-05-01
Payer: MEDICARE

## 2025-05-01 VITALS
RESPIRATION RATE: 16 BRPM | WEIGHT: 156 LBS | OXYGEN SATURATION: 95 % | BODY MASS INDEX: 20.58 KG/M2 | HEART RATE: 95 BPM | DIASTOLIC BLOOD PRESSURE: 48 MMHG | SYSTOLIC BLOOD PRESSURE: 90 MMHG | TEMPERATURE: 97.9 F

## 2025-05-01 DIAGNOSIS — H53.8 BLURRY VISION, RIGHT EYE: ICD-10-CM

## 2025-05-01 DIAGNOSIS — R35.0 FREQUENT URINATION: ICD-10-CM

## 2025-05-01 DIAGNOSIS — S09.90XA INJURY OF HEAD, INITIAL ENCOUNTER: Primary | ICD-10-CM
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
## 2025-05-01 DIAGNOSIS — R35.0 BENIGN PROSTATIC HYPERPLASIA WITH URINARY FREQUENCY: Primary | ICD-10-CM

## 2025-05-01 DIAGNOSIS — S09.90XA TRAUMATIC INJURY OF HEAD, INITIAL ENCOUNTER: ICD-10-CM

## 2025-05-01 DIAGNOSIS — N40.1 BENIGN PROSTATIC HYPERPLASIA WITH URINARY FREQUENCY: Primary | ICD-10-CM

## 2025-05-01 DIAGNOSIS — S09.90XA INJURY OF HEAD, INITIAL ENCOUNTER: ICD-10-CM

## 2025-05-01 DIAGNOSIS — R63.2 POLYPHAGIA: ICD-10-CM

## 2025-05-01 LAB — HBA1C MFR BLD: 5.4 %

## 2025-05-01 PROCEDURE — 83036 HEMOGLOBIN GLYCOSYLATED A1C: CPT | Performed by: NURSE PRACTITIONER

## 2025-05-01 PROCEDURE — 70200 X-RAY EXAM OF EYE SOCKETS: CPT

## 2025-05-01 PROCEDURE — 70150 X-RAY EXAM OF FACIAL BONES: CPT

## 2025-05-01 NOTE — TELEPHONE ENCOUNTER
Vianey with [facility] wanted to let Kati know they don't have Avodart. They do have Proscar. Can escribe to pharmacy.     [facility] - [place], [place] - [address] - P [phone] - F [phone]  [address] [address]  Phone: [phone] Fax: [phone]

## 2025-05-01 NOTE — PROGRESS NOTES
Subjective:     Nain Muñoz is a 65 y.o. male who presents today with the following:  Chief Complaint   Patient presents with    Fall     Last night from bed with right eye injury       History of Present Illness  The patient presents for evaluation of a right eye injury, Parkinson's disease, and urinary frequency. He is accompanied by his roommate.    A known diagnosis of Parkinson's disease is managed [facility] Dr. Landrum and [facility] [place]. Blood pressure is typically low, and it is reported that Parkinson's medications further lower it. Medications were taken at 10 AM today. The paramedics who responded to the incident last night noted that blood pressure was normal, even though medication had not been taken since 10 PM.    Vivid dreams are experienced, likely due to medication rather than Parkinson's disease itself. During one such episode last night, a fall from the bed resulted in an injury to the right eye, possibly from hitting the handle of a nearby object. The paramedics who arrived at approximately 1:30 AM assessed the injury as non-serious but recommended an ophthalmology consultation. Blurry vision in the affected eye is reported, but no headaches. There is no regular ophthalmologist. Ice was not applied to the injured area. This is the second time falling out of bed in the past few weeks. Consciousness was maintained during the fall, and help was called for.    A history of benign prostatic hypertrophy (BPH) is noted, and a referral back to [facility] for management of prostate issues has been made. Results of the PSA test have not yet been received. Frequent urination at night is reported, which may be exacerbated by high sugar intake and excessive fluid consumption. A chamber pot is typically used for convenience.    Patient Active Problem List   Diagnosis    Parkinson's disease (HCC)        Denies chest pain, dyspnea, palpitations, headache and blurred vision. Blood pressure

## 2025-05-01 NOTE — PROGRESS NOTES
\"Have you been to the ER, urgent care clinic since your last visit?  Hospitalized since your last visit?\"    NO    “Have you seen or consulted any other health care providers outside of [facility] since your last visit?”    NO        “Have you had a colorectal cancer screening such as a colonoscopy/FIT/Cologuard?    yes    No colonoscopy on file  No cologuard on file  No FIT/FOBT on file   No flexible sigmoidoscopy on file         Click Here for Release of Records Request      Chief Complaint   Patient presents with    Fall     Last night from bed with right eye injury         Vitals:    05/01/25 1055   BP: (!) 90/48   Pulse: 95   Resp: 16   Temp: 97.9 °F (36.6 °C)   SpO2: 95%

## 2025-05-02 RX ORDER — FINASTERIDE 5 MG/1
5 TABLET, FILM COATED ORAL DAILY
Qty: 90 TABLET | Refills: 1 | Status: SHIPPED | OUTPATIENT
Start: 2025-05-02

## 2025-05-02 RX ORDER — FINASTERIDE 5 MG/1
5 TABLET, FILM COATED ORAL DAILY
Qty: 90 TABLET | Refills: 1 | Status: SHIPPED | OUTPATIENT
Start: 2025-05-02 | End: 2025-05-02

## 2025-05-04 ENCOUNTER — RESULTS FOLLOW-UP (OUTPATIENT)
Age: 65
End: 2025-05-04

## 2025-05-05 ENCOUNTER — TELEPHONE (OUTPATIENT)
Age: 65
End: 2025-05-05

## 2025-05-06 ENCOUNTER — TELEPHONE (OUTPATIENT)
Age: 65
End: 2025-05-06

## 2025-05-06 NOTE — RESULT ENCOUNTER NOTE
Patient verified stating name and date of birth.  Patient informed of xray results and states understanding.

## 2025-05-06 NOTE — TELEPHONE ENCOUNTER
Pt has a question about 2 of his prescriptions, Finasteride and dutasteride and would like a call back. Please advise.

## 2025-05-07 ENCOUNTER — TELEPHONE (OUTPATIENT)
Age: 65
End: 2025-05-07

## 2025-05-07 NOTE — TELEPHONE ENCOUNTER
Pt states that he thought he was supposed to be getting a prescription called in today but nothing has been sent to pharmacy. Please advise.

## 2025-05-20 ENCOUNTER — OFFICE VISIT (OUTPATIENT)
Age: 65
End: 2025-05-20
Payer: OTHER GOVERNMENT

## 2025-05-20 VITALS
SYSTOLIC BLOOD PRESSURE: 104 MMHG | BODY MASS INDEX: 21.11 KG/M2 | DIASTOLIC BLOOD PRESSURE: 58 MMHG | TEMPERATURE: 97.8 F | HEART RATE: 72 BPM | OXYGEN SATURATION: 100 % | WEIGHT: 160 LBS | RESPIRATION RATE: 20 BRPM

## 2025-05-20 DIAGNOSIS — G20.A2 PARKINSON'S DISEASE WITH FLUCTUATING MANIFESTATIONS, UNSPECIFIED WHETHER DYSKINESIA PRESENT (HCC): ICD-10-CM

## 2025-05-20 DIAGNOSIS — L57.0 AK (ACTINIC KERATOSIS): Primary | ICD-10-CM

## 2025-05-20 PROCEDURE — 17000 DESTRUCT PREMALG LESION: CPT

## 2025-05-20 PROCEDURE — 99214 OFFICE O/P EST MOD 30 MIN: CPT

## 2025-05-20 PROCEDURE — 1123F ACP DISCUSS/DSCN MKR DOCD: CPT

## 2025-05-20 ASSESSMENT — ENCOUNTER SYMPTOMS
ALLERGIC/IMMUNOLOGIC NEGATIVE: 1
CONSTIPATION: 0
WHEEZING: 0
BLOOD IN STOOL: 0
DIARRHEA: 0
SHORTNESS OF BREATH: 0
EYES NEGATIVE: 1
RESPIRATORY NEGATIVE: 1
COUGH: 0

## 2025-05-20 NOTE — PROGRESS NOTES
Have you been to the ER, urgent care clinic since your last visit?  Hospitalized since your last visit?   NO    Have you seen or consulted any other health care providers outside our system since your last visit?   NO      “Have you had a colorectal cancer screening such as a colonoscopy/FIT/Cologuard?    NO    No colonoscopy on file  No cologuard on file  No FIT/FOBT on file   No flexible sigmoidoscopy on file       Chief Complaint   Patient presents with    Other     [facility]mv forms     Skin Problem     Right side of face peeling, redness and itching x 6 mnths        Vitals:    05/20/25 1514   BP: (!) 104/58   Pulse: 72   Resp: 20   Temp: 97.8 °F (36.6 °C)   SpO2: 100%     
  Genitourinary: Negative.    Musculoskeletal: Negative.    Skin: Negative.    Allergic/Immunologic: Negative.    Neurological: Negative.  Negative for dizziness and headaches.   Hematological: Negative.    Psychiatric/Behavioral: Negative.  Negative for dysphoric mood and sleep disturbance. The patient is not nervous/anxious.         Vitals:    05/20/25 1514   BP: (!) 104/58   Pulse: 72   Resp: 20   Temp: 97.8 °F (36.6 °C)   SpO2: 100%   Weight: 72.6 kg (160 lb)        Wt Readings from Last 3 Encounters:   05/20/25 72.6 kg (160 lb)   05/01/25 70.8 kg (156 lb)   04/25/25 71.7 kg (158 lb)           4/25/2025     2:25 PM   PHQ-9    Little interest or pleasure in doing things 0   Feeling down, depressed, or hopeless 0   PHQ-2 Score 0   PHQ-9 Total Score 0        Physical Exam  Vitals and nursing note reviewed.   Constitutional:       General: He is not in acute distress.     Appearance: Normal appearance.   HENT:      Head: Normocephalic and atraumatic.   Cardiovascular:      Rate and Rhythm: Normal rate.   Pulmonary:      Effort: Pulmonary effort is normal.   Skin:     Comments: 3mm scaly, hyperpigmented lesion on right upper cheek   Neurological:      General: No focal deficit present.      Mental Status: He is alert and oriented to person, place, and time.      Comments: Shuffling gait   Psychiatric:         Mood and Affect: Mood normal.         Behavior: Behavior normal.         Thought Content: Thought content normal.         Judgment: Judgment normal.        Assessment & Plan  AK (actinic keratosis)   Orders:    DESTRUC PREMALIGNANT, FIRST LESION    Parkinson's disease with fluctuating manifestations, unspecified whether dyskinesia present ([facility])           Assessment & Plan  1. Actinic keratosis: Acute.  - Liquid nitrogen was used to freeze the lesion, which destroys the abnormal cells.  - Advised to apply Vaseline to the area for the next few days and to avoid rubbing, scratching, or exposing it to the sun until

## 2025-05-27 ENCOUNTER — TRANSCRIBE ORDERS (OUTPATIENT)
Facility: HOSPITAL | Age: 65
End: 2025-05-27

## 2025-05-27 DIAGNOSIS — R33.9 URINARY RETENTION: Primary | ICD-10-CM

## 2025-05-30 ENCOUNTER — HOSPITAL ENCOUNTER (OUTPATIENT)
Facility: HOSPITAL | Age: 65
Discharge: HOME | End: 2025-05-30
Payer: OTHER GOVERNMENT

## 2025-05-30 DIAGNOSIS — R33.9 URINARY RETENTION: ICD-10-CM

## 2025-05-30 PROCEDURE — 51798 US URINE CAPACITY MEASURE: CPT
